# Patient Record
Sex: MALE | Race: BLACK OR AFRICAN AMERICAN | NOT HISPANIC OR LATINO | Employment: OTHER | RURAL
[De-identification: names, ages, dates, MRNs, and addresses within clinical notes are randomized per-mention and may not be internally consistent; named-entity substitution may affect disease eponyms.]

---

## 2018-02-26 ENCOUNTER — HISTORICAL (OUTPATIENT)
Dept: ADMINISTRATIVE | Facility: HOSPITAL | Age: 68
End: 2018-02-26

## 2018-02-26 LAB — CRC RECOMMENDATION EXT: NORMAL

## 2018-02-27 LAB
LAB AP CLINICAL INFORMATION: NORMAL
LAB AP DIAGNOSIS - HISTORICAL: NORMAL
LAB AP GROSS PATHOLOGY - HISTORICAL: NORMAL
LAB AP SPECIMEN SUBMITTED - HISTORICAL: NORMAL

## 2020-06-29 ENCOUNTER — HISTORICAL (OUTPATIENT)
Dept: ADMINISTRATIVE | Facility: HOSPITAL | Age: 70
End: 2020-06-29

## 2021-09-08 ENCOUNTER — OFFICE VISIT (OUTPATIENT)
Dept: FAMILY MEDICINE | Facility: CLINIC | Age: 71
End: 2021-09-08
Payer: MEDICARE

## 2021-09-08 VITALS
WEIGHT: 221 LBS | SYSTOLIC BLOOD PRESSURE: 130 MMHG | BODY MASS INDEX: 30.94 KG/M2 | HEIGHT: 71 IN | RESPIRATION RATE: 20 BRPM | TEMPERATURE: 98 F | DIASTOLIC BLOOD PRESSURE: 70 MMHG | OXYGEN SATURATION: 97 % | HEART RATE: 80 BPM

## 2021-09-08 DIAGNOSIS — M10.9 ACUTE GOUT, UNSPECIFIED CAUSE, UNSPECIFIED SITE: Primary | ICD-10-CM

## 2021-09-08 DIAGNOSIS — R21 RASH AND NONSPECIFIC SKIN ERUPTION: ICD-10-CM

## 2021-09-08 DIAGNOSIS — R60.0 EDEMA OF LEFT LOWER EXTREMITY: ICD-10-CM

## 2021-09-08 PROCEDURE — 99214 PR OFFICE/OUTPT VISIT, EST, LEVL IV, 30-39 MIN: ICD-10-PCS | Mod: 25,,, | Performed by: NURSE PRACTITIONER

## 2021-09-08 PROCEDURE — 96372 THER/PROPH/DIAG INJ SC/IM: CPT | Mod: ,,, | Performed by: NURSE PRACTITIONER

## 2021-09-08 PROCEDURE — 96372 PR INJECTION,THERAP/PROPH/DIAG2ST, IM OR SUBCUT: ICD-10-PCS | Mod: ,,, | Performed by: NURSE PRACTITIONER

## 2021-09-08 PROCEDURE — 99214 OFFICE O/P EST MOD 30 MIN: CPT | Mod: 25,,, | Performed by: NURSE PRACTITIONER

## 2021-09-08 RX ORDER — TRIAMCINOLONE ACETONIDE 40 MG/ML
40 INJECTION, SUSPENSION INTRA-ARTICULAR; INTRAMUSCULAR
Status: COMPLETED | OUTPATIENT
Start: 2021-09-08 | End: 2021-09-08

## 2021-09-08 RX ORDER — TRIAMCINOLONE ACETONIDE 0.25 MG/G
CREAM TOPICAL 2 TIMES DAILY
Qty: 80 G | Refills: 1 | Status: SHIPPED | OUTPATIENT
Start: 2021-09-08 | End: 2022-07-27 | Stop reason: SDUPTHER

## 2021-09-08 RX ORDER — COLCHICINE 0.6 MG/1
1.2 TABLET ORAL DAILY
Qty: 8 TABLET | Refills: 0 | Status: SHIPPED | OUTPATIENT
Start: 2021-09-08 | End: 2022-07-27

## 2021-09-08 RX ADMIN — TRIAMCINOLONE ACETONIDE 40 MG: 40 INJECTION, SUSPENSION INTRA-ARTICULAR; INTRAMUSCULAR at 11:09

## 2021-12-21 ENCOUNTER — OFFICE VISIT (OUTPATIENT)
Dept: FAMILY MEDICINE | Facility: CLINIC | Age: 71
End: 2021-12-21
Payer: MEDICARE

## 2021-12-21 VITALS
OXYGEN SATURATION: 98 % | HEIGHT: 71 IN | RESPIRATION RATE: 16 BRPM | WEIGHT: 223.38 LBS | DIASTOLIC BLOOD PRESSURE: 86 MMHG | BODY MASS INDEX: 31.27 KG/M2 | TEMPERATURE: 97 F | SYSTOLIC BLOOD PRESSURE: 138 MMHG | HEART RATE: 76 BPM

## 2021-12-21 DIAGNOSIS — Z13.1 SCREENING FOR DIABETES MELLITUS (DM): ICD-10-CM

## 2021-12-21 DIAGNOSIS — E78.2 MIXED HYPERLIPIDEMIA: ICD-10-CM

## 2021-12-21 DIAGNOSIS — N52.9 ERECTILE DYSFUNCTION, UNSPECIFIED ERECTILE DYSFUNCTION TYPE: ICD-10-CM

## 2021-12-21 DIAGNOSIS — M54.2 NECK PAIN: Primary | ICD-10-CM

## 2021-12-21 DIAGNOSIS — I49.3 PVC'S (PREMATURE VENTRICULAR CONTRACTIONS): ICD-10-CM

## 2021-12-21 DIAGNOSIS — I10 PRIMARY HYPERTENSION: ICD-10-CM

## 2021-12-21 DIAGNOSIS — Z12.5 SCREENING FOR MALIGNANT NEOPLASM OF PROSTATE: ICD-10-CM

## 2021-12-21 PROCEDURE — 99214 PR OFFICE/OUTPT VISIT, EST, LEVL IV, 30-39 MIN: ICD-10-PCS | Mod: ,,, | Performed by: FAMILY MEDICINE

## 2021-12-21 PROCEDURE — 99214 OFFICE O/P EST MOD 30 MIN: CPT | Mod: ,,, | Performed by: FAMILY MEDICINE

## 2021-12-21 RX ORDER — SILDENAFIL 25 MG/1
TABLET, FILM COATED ORAL
COMMUNITY
Start: 2021-08-18 | End: 2021-12-21 | Stop reason: SDUPTHER

## 2021-12-21 RX ORDER — DOXEPIN HYDROCHLORIDE 25 MG/1
2 CAPSULE ORAL NIGHTLY
COMMUNITY
Start: 2021-08-29 | End: 2022-07-27 | Stop reason: SDUPTHER

## 2021-12-21 RX ORDER — SILDENAFIL 50 MG/1
50 TABLET, FILM COATED ORAL
Qty: 30 TABLET | Refills: 0 | Status: SHIPPED | OUTPATIENT
Start: 2021-12-21 | End: 2022-04-19 | Stop reason: SDUPTHER

## 2021-12-21 RX ORDER — TIZANIDINE 4 MG/1
4 TABLET ORAL EVERY 6 HOURS PRN
Qty: 30 TABLET | Refills: 3 | Status: SHIPPED | OUTPATIENT
Start: 2021-12-21 | End: 2021-12-31

## 2021-12-23 RX ORDER — METOPROLOL SUCCINATE 100 MG/1
100 TABLET, EXTENDED RELEASE ORAL DAILY
Qty: 90 TABLET | Refills: 3 | Status: SHIPPED | OUTPATIENT
Start: 2021-12-23 | End: 2022-07-27 | Stop reason: SDUPTHER

## 2021-12-23 RX ORDER — PRAVASTATIN SODIUM 40 MG/1
40 TABLET ORAL NIGHTLY
Qty: 90 TABLET | Refills: 3 | Status: SHIPPED | OUTPATIENT
Start: 2021-12-23 | End: 2022-07-27 | Stop reason: SDUPTHER

## 2022-01-02 DIAGNOSIS — R73.9 HYPERGLYCEMIA: Primary | ICD-10-CM

## 2022-01-12 DIAGNOSIS — E11.9 TYPE 2 DIABETES MELLITUS WITHOUT COMPLICATION, WITHOUT LONG-TERM CURRENT USE OF INSULIN: Primary | ICD-10-CM

## 2022-01-12 RX ORDER — METFORMIN HYDROCHLORIDE 500 MG/1
500 TABLET ORAL 2 TIMES DAILY WITH MEALS
Qty: 180 TABLET | Refills: 3 | Status: SHIPPED | OUTPATIENT
Start: 2022-01-12 | End: 2022-07-27

## 2022-04-19 DIAGNOSIS — N52.9 ERECTILE DYSFUNCTION, UNSPECIFIED ERECTILE DYSFUNCTION TYPE: ICD-10-CM

## 2022-04-20 RX ORDER — SILDENAFIL 50 MG/1
50 TABLET, FILM COATED ORAL
Qty: 30 TABLET | Refills: 0 | Status: SHIPPED | OUTPATIENT
Start: 2022-04-20 | End: 2022-12-21 | Stop reason: SDUPTHER

## 2022-06-29 LAB
LEFT EYE DM RETINOPATHY: NEGATIVE
RIGHT EYE DM RETINOPATHY: NEGATIVE

## 2022-07-27 ENCOUNTER — OFFICE VISIT (OUTPATIENT)
Dept: FAMILY MEDICINE | Facility: CLINIC | Age: 72
End: 2022-07-27
Payer: MEDICARE

## 2022-07-27 VITALS
HEART RATE: 85 BPM | OXYGEN SATURATION: 98 % | TEMPERATURE: 98 F | DIASTOLIC BLOOD PRESSURE: 60 MMHG | WEIGHT: 203 LBS | SYSTOLIC BLOOD PRESSURE: 110 MMHG | BODY MASS INDEX: 28.42 KG/M2 | HEIGHT: 71 IN

## 2022-07-27 DIAGNOSIS — M10.9 GOUT, UNSPECIFIED CAUSE, UNSPECIFIED CHRONICITY, UNSPECIFIED SITE: ICD-10-CM

## 2022-07-27 DIAGNOSIS — I10 HYPERTENSION, UNSPECIFIED TYPE: ICD-10-CM

## 2022-07-27 DIAGNOSIS — R21 RASH AND NONSPECIFIC SKIN ERUPTION: ICD-10-CM

## 2022-07-27 DIAGNOSIS — E78.5 HYPERLIPIDEMIA, UNSPECIFIED HYPERLIPIDEMIA TYPE: ICD-10-CM

## 2022-07-27 PROCEDURE — 99213 PR OFFICE/OUTPT VISIT, EST, LEVL III, 20-29 MIN: ICD-10-PCS | Mod: ,,, | Performed by: NURSE PRACTITIONER

## 2022-07-27 PROCEDURE — 99213 OFFICE O/P EST LOW 20 MIN: CPT | Mod: ,,, | Performed by: NURSE PRACTITIONER

## 2022-07-27 PROCEDURE — 96372 THER/PROPH/DIAG INJ SC/IM: CPT | Mod: ,,, | Performed by: NURSE PRACTITIONER

## 2022-07-27 PROCEDURE — 96372 PR INJECTION,THERAP/PROPH/DIAG2ST, IM OR SUBCUT: ICD-10-PCS | Mod: ,,, | Performed by: NURSE PRACTITIONER

## 2022-07-27 RX ORDER — COLCHICINE 0.6 MG/1
0.6 TABLET ORAL DAILY PRN
Qty: 90 TABLET | Refills: 1 | Status: SHIPPED | OUTPATIENT
Start: 2022-07-27 | End: 2022-12-21 | Stop reason: SDUPTHER

## 2022-07-27 RX ORDER — PRAVASTATIN SODIUM 40 MG/1
40 TABLET ORAL NIGHTLY
Qty: 90 TABLET | Refills: 3 | Status: SHIPPED | OUTPATIENT
Start: 2022-07-27 | End: 2022-12-21 | Stop reason: SDUPTHER

## 2022-07-27 RX ORDER — TRIAMCINOLONE ACETONIDE 0.25 MG/G
CREAM TOPICAL 2 TIMES DAILY
Qty: 80 G | Refills: 1 | Status: SHIPPED | OUTPATIENT
Start: 2022-07-27 | End: 2022-12-21 | Stop reason: SDUPTHER

## 2022-07-27 RX ORDER — DOXEPIN HYDROCHLORIDE 25 MG/1
50 CAPSULE ORAL NIGHTLY
Qty: 30 CAPSULE | Refills: 3 | Status: SHIPPED | OUTPATIENT
Start: 2022-07-27

## 2022-07-27 RX ORDER — COLCHICINE 0.6 MG/1
0.6 TABLET ORAL DAILY PRN
Qty: 20 TABLET | Refills: 3 | Status: SHIPPED | OUTPATIENT
Start: 2022-07-27 | End: 2022-07-27 | Stop reason: SDUPTHER

## 2022-07-27 RX ORDER — METHYLPREDNISOLONE ACETATE 40 MG/ML
40 INJECTION, SUSPENSION INTRA-ARTICULAR; INTRALESIONAL; INTRAMUSCULAR; SOFT TISSUE
Status: COMPLETED | OUTPATIENT
Start: 2022-07-27 | End: 2022-07-27

## 2022-07-27 RX ORDER — METOPROLOL SUCCINATE 100 MG/1
100 TABLET, EXTENDED RELEASE ORAL DAILY
Qty: 90 TABLET | Refills: 3 | Status: SHIPPED | OUTPATIENT
Start: 2022-07-27 | End: 2022-12-21 | Stop reason: SDUPTHER

## 2022-07-27 RX ADMIN — METHYLPREDNISOLONE ACETATE 40 MG: 40 INJECTION, SUSPENSION INTRA-ARTICULAR; INTRALESIONAL; INTRAMUSCULAR; SOFT TISSUE at 01:07

## 2022-07-27 NOTE — PROGRESS NOTES
Corinne Lyles NP   [facility]  [address]  [place]  [address]      PATIENT NAME: Noemi Buckner  : 1950  DATE: 22  MRN: [id number]      Billing Provider: Corinne Lyles NP  Level of Service:   Patient PCP Information     Provider PCP Type    Corinne Lyles NP General          Reason for Visit / Chief Complaint: Medication Refill (Gout medicine )       Update PCP  Update Chief Complaint         History of Present Illness / Problem Focused Workflow     Noemi Buckner presents to the clinic with Medication Refill (Gout medicine )     72 year old male presents to clinic with requests for refills. He also states he is having a gout flare to left foot.  Dorsal aspect of left foot is swollen, red, and tender.  He has PMH of HTN, Hyperlipidemia, Gout, and Arthritis.         Review of Systems     @Review of Systems   Constitutional: Negative for activity change and unexpected weight change.   HENT: Negative for hearing loss, rhinorrhea and trouble swallowing.    Eyes: Negative for discharge and visual disturbance.   Respiratory: Negative for chest tightness and wheezing.    Cardiovascular: Negative for chest pain and palpitations.   Gastrointestinal: Negative for blood in stool, constipation, diarrhea and vomiting.   Endocrine: Negative for polydipsia and polyuria.   Genitourinary: Negative for difficulty urinating, hematuria and urgency.   Musculoskeletal: Positive for arthralgias. Negative for joint swelling and neck pain.   Neurological: Negative for weakness and headaches.   Psychiatric/Behavioral: Negative for confusion and dysphoric mood.       Medical / Social / Family History     Past Medical History:   Diagnosis Date    Arthritis     Gout a    Hyperlipidemia     Hypertension        Past Surgical History:   Procedure Laterality Date    CIRCUMCISION         Social History    reports that he has never smoked. He has never used smokeless tobacco. He reports that he  does not drink alcohol and does not use drugs.    Family History  's family history is not on file.    Medications and Allergies     Medications  Outpatient Medications Marked as Taking for the 7/27/22 encounter (Office Visit) with Corinne Lyles NP   Medication Sig Dispense Refill    sildenafiL (VIAGRA) 50 MG tablet Take 1 tablet (50 mg total) by mouth as needed for Erectile Dysfunction. 30 tablet 0    [DISCONTINUED] COLCRYS 0.6 mg tablet TAKE 1 TABLET DAILY AS NEEDED 90 tablet 3    [DISCONTINUED] doxepin (SINEQUAN) 25 MG capsule Take 2 capsules by mouth nightly.      [DISCONTINUED] metoprolol succinate (TOPROL-XL) 100 MG 24 hr tablet Take 1 tablet (100 mg total) by mouth once daily. 90 tablet 3    [DISCONTINUED] pravastatin (PRAVACHOL) 40 MG tablet Take 1 tablet (40 mg total) by mouth nightly. 90 tablet 3    [DISCONTINUED] triamcinolone acetonide 0.025% (KENALOG) 0.025 % cream Apply topically 2 (two) times daily. 80 g 1       Allergies  Review of patient's allergies indicates:   Allergen Reactions    Bactrim [sulfamethoxazole-trimethoprim]        Physical Examination     Vitals:    07/27/22 1308   BP: 110/60   Pulse: 85   Temp: 97.7 °F (36.5 °C)     Physical Exam  Vitals and nursing note reviewed.   Constitutional:       Appearance: He is obese.   HENT:      Head: Normocephalic.      Right Ear: Tympanic membrane normal.      Left Ear: Tympanic membrane normal.      Nose: Nose normal.      Mouth/Throat:      Mouth: Mucous membranes are moist.      Pharynx: Oropharynx is clear. No posterior oropharyngeal erythema.   Eyes:      Conjunctiva/sclera: Conjunctivae normal.   Cardiovascular:      Rate and Rhythm: Normal rate and regular rhythm.      Heart sounds: Normal heart sounds.   Pulmonary:      Effort: Pulmonary effort is normal.      Breath sounds: Normal breath sounds.   Abdominal:      General: Abdomen is flat. Bowel sounds are normal. There is no distension.      Palpations: Abdomen is soft.    Musculoskeletal:         General: No swelling or tenderness.      Cervical back: Normal range of motion.      Right lower leg: No edema.      Left lower leg: No edema.      Left foot: Swelling and bony tenderness present.      Comments: Redness, swelling, and tenderness to dorsal aspect of left foot.    Skin:     General: Skin is warm and dry.      Capillary Refill: Capillary refill takes less than 2 seconds.   Neurological:      Mental Status: He is alert. Mental status is at baseline.   Psychiatric:         Mood and Affect: Mood normal.         Behavior: Behavior normal.               Lab Results   Component Value Date    WBC 5.59 12/23/2021    HGB 13.6 12/23/2021    HCT 44.0 12/23/2021    MCV 87.3 12/23/2021     12/23/2021          Sodium   Date Value Ref Range Status   12/23/2021 142 136 - 145 mmol/L Final     Potassium   Date Value Ref Range Status   12/23/2021 4.0 3.5 - 5.1 mmol/L Final     Chloride   Date Value Ref Range Status   12/23/2021 107 98 - 107 mmol/L Final     CO2   Date Value Ref Range Status   12/23/2021 30 21 - 32 mmol/L Final     Glucose   Date Value Ref Range Status   12/23/2021 128 (H) 74 - 106 mg/dL Final     BUN   Date Value Ref Range Status   12/23/2021 14 7 - 18 mg/dL Final     Creatinine   Date Value Ref Range Status   12/23/2021 1.50 (H) 0.70 - 1.30 mg/dL Final     Calcium   Date Value Ref Range Status   12/23/2021 8.7 8.5 - 10.1 mg/dL Final     Total Protein   Date Value Ref Range Status   12/23/2021 7.7 6.4 - 8.2 g/dL Final     Albumin   Date Value Ref Range Status   12/23/2021 3.2 (L) 3.5 - 5.0 g/dL Final     Bilirubin, Total   Date Value Ref Range Status   12/23/2021 0.3 0.0 - 1.2 mg/dL Final     Alk Phos   Date Value Ref Range Status   12/23/2021 81 45 - 115 U/L Final     AST   Date Value Ref Range Status   12/23/2021 15 15 - 37 U/L Final     ALT   Date Value Ref Range Status   12/23/2021 25 16 - 61 U/L Final     Anion Gap   Date Value Ref Range Status   12/23/2021 9 7 - 16  mmol/L Final     eGFR   Date Value Ref Range Status   12/23/2021 49 (L) >=60 mL/min/1.73m² Final      X-Ray Hand 3 view Right  Narrative: Place of service: [facility]    PROCEDURE: XR HAND RIGHT COMPLETE    HISTORY:   Right hand pain     COMPARISON: None.    FINDINGS:   Mineralization is normal. There is osteoarthritis at the radial-carpal  joint space, the first carpal-metacarpal joint space, the  navicular-trapezium articulation, involving the first MCP joint, and  involving the DIP joints of all of the fingers. Severe osteoarthritis  present involving the PIP joint of the middle finger. No subluxation or  fracture is seen.  Impression: IMPRESSION:   Osteoarthritis is present involving multiple sites.    This report has been electronically signed by Jennifer Barton     Procedures   Assessment and Plan (including Health Maintenance)      Problem List  Smart Sets  Document Outside HM   :    Plan:           Problem List Items Addressed This Visit    None     Visit Diagnoses     Rash and nonspecific skin eruption        Relevant Medications    triamcinolone acetonide 0.025% (KENALOG) 0.025 % cream          Health Maintenance Topics with due status: Not Due       Topic Last Completion Date    Influenza Vaccine 11/20/2021    Lipid Panel 12/23/2021       No future appointments.     Health Maintenance Due   Topic Date Due    Hepatitis C Screening  Never done    COVID-19 Vaccine (1) Never done    TETANUS VACCINE  Never done    Colorectal Cancer Screening  Never done    Shingles Vaccine (1 of 2) Never done    Pneumococcal Vaccines (Age 65+) (1 - PCV) Never done        No follow-ups on file.     Signature:  Corinne Lyles NP  [facility]  [address] [place]  [address]    Date of encounter: 7/27/22

## 2022-07-28 PROBLEM — R21 RASH AND NONSPECIFIC SKIN ERUPTION: Status: ACTIVE | Noted: 2022-07-28

## 2022-07-28 NOTE — ASSESSMENT & PLAN NOTE
Colchicine as ordered.   Reviewed pathology of current symptoms, medication side effects/risk/benefits/directions on taking medications, and discussed worsening symptoms to return to clinic or if after hours to go to ER. Reviewed preventative management as well. Instructed patient to increase water intake, adhere to a low purine diet, and cut back on red meats, shellfish, and alcohol. All medication benefits and risks discussed with patient. Instructed the patient to return if symptoms persist or worsen.

## 2022-07-28 NOTE — ASSESSMENT & PLAN NOTE
Continue current medication. LDL goal is less than 70.  Lipid panel ordered, patient has eaten prior to arrival. Instructed patient to return to clinic to have labwork done when fasting.

## 2022-07-28 NOTE — ASSESSMENT & PLAN NOTE
Continue current medications  Lfestyle changes to help lower blood pressure reviewed, DASH eating plan encouraged, Weight loss of 0.5-1 lb/week suggested, stressed the importance of routine exercise 30 minutes per day for 3-5 days/week, sodium restriction, and alcohol in moderation less than 2 drinks a day. Patient was encouraged to check blood pressure daily (at home and work), blood pressure log provided, bring BP log to next appointment, and to monitor for discussed worsening symptoms that require emergent medical attention.

## 2022-12-14 ENCOUNTER — OFFICE VISIT (OUTPATIENT)
Dept: GASTROENTEROLOGY | Facility: CLINIC | Age: 72
End: 2022-12-14
Payer: MEDICARE

## 2022-12-14 VITALS
HEIGHT: 71 IN | BODY MASS INDEX: 29.79 KG/M2 | HEART RATE: 66 BPM | DIASTOLIC BLOOD PRESSURE: 71 MMHG | WEIGHT: 212.81 LBS | SYSTOLIC BLOOD PRESSURE: 130 MMHG | OXYGEN SATURATION: 97 %

## 2022-12-14 DIAGNOSIS — Z86.010 PERSONAL HISTORY OF COLONIC POLYPS: ICD-10-CM

## 2022-12-14 PROBLEM — Z86.0100 PERSONAL HISTORY OF COLONIC POLYPS: Status: ACTIVE | Noted: 2022-12-14

## 2022-12-14 PROCEDURE — 99499 NO LOS: ICD-10-PCS | Mod: ,,, | Performed by: NURSE PRACTITIONER

## 2022-12-14 PROCEDURE — 99499 UNLISTED E&M SERVICE: CPT | Mod: ,,, | Performed by: NURSE PRACTITIONER

## 2022-12-14 RX ORDER — HYDROCODONE BITARTRATE AND ACETAMINOPHEN 5; 325 MG/1; MG/1
1 TABLET ORAL
COMMUNITY
Start: 2022-12-09 | End: 2023-07-14

## 2022-12-14 RX ORDER — AMLODIPINE BESYLATE 2.5 MG/1
2.5 TABLET ORAL
COMMUNITY
Start: 2022-11-30 | End: 2022-12-21 | Stop reason: SDUPTHER

## 2022-12-14 RX ORDER — CLINDAMYCIN HYDROCHLORIDE 300 MG/1
300 CAPSULE ORAL 3 TIMES DAILY
COMMUNITY
Start: 2022-12-09 | End: 2023-05-04

## 2022-12-14 RX ORDER — TIZANIDINE 4 MG/1
4 TABLET ORAL EVERY 6 HOURS PRN
COMMUNITY
Start: 2022-12-11 | End: 2022-12-21 | Stop reason: SDUPTHER

## 2022-12-14 NOTE — PROGRESS NOTES
Noemi Buckner is a 72 y.o. male here for Weight Loss        PCP: Corinne Lyles  Referring Provider: No referring provider defined for this encounter.     HPI:  Presents to discuss scheduling colonoscopy. Last colonoscopy 2/26/18 reviewed, TA x 3 noted. Colonoscopy per Dr. Alvarenga. Reports that his weight is increased and that his appetite is good. No dysphagia or abdominal pain. He is due to get lab at Dr. Yang office next Wed.         ROS:  Review of Systems   Constitutional:  Negative for activity change, fatigue, fever and unexpected weight change (improved).   HENT:  Negative for trouble swallowing.    Respiratory:  Negative for cough.    Cardiovascular:  Negative for chest pain.   Gastrointestinal:  Negative for abdominal distention, abdominal pain, blood in stool, change in bowel habit, constipation, diarrhea, nausea, vomiting, reflux and change in bowel habit.   Musculoskeletal:  Negative for gait problem.   Integumentary:  Negative for color change.   Neurological:  Negative for dizziness.   Hematological:  Does not bruise/bleed easily.   Psychiatric/Behavioral:  Negative for sleep disturbance. The patient is not nervous/anxious.         PMHX:  has a past medical history of Arthritis, Gout (a), Hyperlipidemia, and Hypertension.    PSHX:  has a past surgical history that includes Circumcision.    PFHX: family history is not on file.    PSlHX:  reports that he has never smoked. He has never used smokeless tobacco. He reports that he does not drink alcohol and does not use drugs.        Review of patient's allergies indicates:   Allergen Reactions    Bactrim [sulfamethoxazole-trimethoprim]        Medication List with Changes/Refills   Current Medications    AMLODIPINE (NORVASC) 2.5 MG TABLET    Take 2.5 mg by mouth.    CLINDAMYCIN (CLEOCIN) 300 MG CAPSULE    Take 300 mg by mouth 3 (three) times daily.    COLCHICINE (COLCRYS) 0.6 MG TABLET    Take 1 tablet (0.6 mg total) by mouth daily as needed (gout  "flare).    DOXEPIN (SINEQUAN) 25 MG CAPSULE    Take 2 capsules (50 mg total) by mouth nightly.    HYDROCODONE-ACETAMINOPHEN (NORCO) 5-325 MG PER TABLET    Take 1 tablet by mouth.    METOPROLOL SUCCINATE (TOPROL-XL) 100 MG 24 HR TABLET    Take 1 tablet (100 mg total) by mouth once daily.    PRAVASTATIN (PRAVACHOL) 40 MG TABLET    Take 1 tablet (40 mg total) by mouth nightly.    SILDENAFIL (VIAGRA) 50 MG TABLET    Take 1 tablet (50 mg total) by mouth as needed for Erectile Dysfunction.    TIZANIDINE (ZANAFLEX) 4 MG TABLET    Take 4 mg by mouth every 6 (six) hours as needed.    TRIAMCINOLONE ACETONIDE 0.025% (KENALOG) 0.025 % CREAM    Apply topically 2 (two) times daily.        Objective Findings:  Vital Signs:  /71   Pulse 66   Ht 5' 11" (1.803 m)   Wt 96.5 kg (212 lb 12.8 oz)   SpO2 97%   BMI 29.68 kg/m²  Body mass index is 29.68 kg/m².    Physical Exam:  Physical Exam  Vitals and nursing note reviewed.   Constitutional:       General: He is not in acute distress.     Appearance: Normal appearance.   HENT:      Mouth/Throat:      Mouth: Mucous membranes are moist.   Cardiovascular:      Rate and Rhythm: Normal rate.   Pulmonary:      Effort: Pulmonary effort is normal.      Breath sounds: No wheezing, rhonchi or rales.   Abdominal:      General: Bowel sounds are normal. There is no distension.      Palpations: Abdomen is soft. There is no mass.      Tenderness: There is no abdominal tenderness. There is no guarding or rebound.      Hernia: No hernia is present.   Skin:     General: Skin is warm and dry.      Coloration: Skin is not jaundiced or pale.   Neurological:      Mental Status: He is alert and oriented to person, place, and time.   Psychiatric:         Mood and Affect: Mood normal.        Labs:  Lab Results   Component Value Date    WBC 5.59 12/23/2021    HGB 13.6 12/23/2021    HCT 44.0 12/23/2021    MCV 87.3 12/23/2021    RDW 16.5 (H) 12/23/2021     12/23/2021    LYMPH 50.4 (H) 12/23/2021 "    LYMPH 2.82 12/23/2021    MONO 12.0 (H) 12/23/2021    EOS 0.17 12/23/2021    BASO 0.08 12/23/2021     Lab Results   Component Value Date     12/23/2021    K 4.0 12/23/2021     12/23/2021    CO2 30 12/23/2021     (H) 12/23/2021    BUN 14 12/23/2021    CREATININE 1.50 (H) 12/23/2021    CALCIUM 8.7 12/23/2021    PROT 7.7 12/23/2021    ALBUMIN 3.2 (L) 12/23/2021    BILITOT 0.3 12/23/2021    ALKPHOS 81 12/23/2021    AST 15 12/23/2021    ALT 25 12/23/2021         Imaging: No results found.      Assessment:  Noemi Buckner is a 72 y.o. male here with:  1. Personal history of colonic polyps          Recommendations:  1. Schedule colonoscopy TA x 3 2018      No follow-ups on file.      Order summary:  Orders Placed This Encounter    Colonoscopy       Thank you for allowing me to participate in the care of Noemi Buckner.      KIANA Mosley

## 2022-12-21 ENCOUNTER — OFFICE VISIT (OUTPATIENT)
Dept: FAMILY MEDICINE | Facility: CLINIC | Age: 72
End: 2022-12-21
Payer: MEDICARE

## 2022-12-21 VITALS
OXYGEN SATURATION: 98 % | BODY MASS INDEX: 28.98 KG/M2 | SYSTOLIC BLOOD PRESSURE: 120 MMHG | RESPIRATION RATE: 18 BRPM | WEIGHT: 207 LBS | DIASTOLIC BLOOD PRESSURE: 76 MMHG | HEIGHT: 71 IN | TEMPERATURE: 98 F | HEART RATE: 94 BPM

## 2022-12-21 DIAGNOSIS — M62.838 MUSCLE SPASM: ICD-10-CM

## 2022-12-21 DIAGNOSIS — R21 RASH AND NONSPECIFIC SKIN ERUPTION: ICD-10-CM

## 2022-12-21 DIAGNOSIS — E11.9 TYPE 2 DIABETES MELLITUS WITHOUT COMPLICATION, WITHOUT LONG-TERM CURRENT USE OF INSULIN: ICD-10-CM

## 2022-12-21 DIAGNOSIS — E78.5 HYPERLIPIDEMIA, UNSPECIFIED HYPERLIPIDEMIA TYPE: ICD-10-CM

## 2022-12-21 DIAGNOSIS — L84 CALLUS OF FOOT: ICD-10-CM

## 2022-12-21 DIAGNOSIS — N52.9 ERECTILE DYSFUNCTION, UNSPECIFIED ERECTILE DYSFUNCTION TYPE: ICD-10-CM

## 2022-12-21 DIAGNOSIS — R60.0 LOWER EXTREMITY EDEMA: ICD-10-CM

## 2022-12-21 DIAGNOSIS — M10.9 GOUT, UNSPECIFIED CAUSE, UNSPECIFIED CHRONICITY, UNSPECIFIED SITE: ICD-10-CM

## 2022-12-21 DIAGNOSIS — I10 HYPERTENSION, UNSPECIFIED TYPE: Primary | ICD-10-CM

## 2022-12-21 LAB
ALBUMIN SERPL BCP-MCNC: 3.3 G/DL (ref 3.5–5)
ALBUMIN/GLOB SERPL: 0.8 {RATIO}
ALP SERPL-CCNC: 73 U/L (ref 45–115)
ALT SERPL W P-5'-P-CCNC: 24 U/L (ref 16–61)
ANION GAP SERPL CALCULATED.3IONS-SCNC: 12 MMOL/L (ref 7–16)
AST SERPL W P-5'-P-CCNC: 17 U/L (ref 15–37)
BASOPHILS # BLD AUTO: 0.06 K/UL (ref 0–0.2)
BASOPHILS NFR BLD AUTO: 1.2 % (ref 0–1)
BILIRUB SERPL-MCNC: 0.2 MG/DL (ref ?–1.2)
BUN SERPL-MCNC: 18 MG/DL (ref 7–18)
BUN/CREAT SERPL: 15 (ref 6–20)
CALCIUM SERPL-MCNC: 8.9 MG/DL (ref 8.5–10.1)
CHLORIDE SERPL-SCNC: 110 MMOL/L (ref 98–107)
CHOLEST SERPL-MCNC: 149 MG/DL (ref 0–200)
CHOLEST/HDLC SERPL: 3.2 {RATIO}
CO2 SERPL-SCNC: 26 MMOL/L (ref 21–32)
CREAT SERPL-MCNC: 1.23 MG/DL (ref 0.7–1.3)
DIFFERENTIAL METHOD BLD: ABNORMAL
EGFR (NO RACE VARIABLE) (RUSH/TITUS): 62 ML/MIN/1.73M²
EOSINOPHIL # BLD AUTO: 0.18 K/UL (ref 0–0.5)
EOSINOPHIL NFR BLD AUTO: 3.5 % (ref 1–4)
ERYTHROCYTE [DISTWIDTH] IN BLOOD BY AUTOMATED COUNT: 15.9 % (ref 11.5–14.5)
EST. AVERAGE GLUCOSE BLD GHB EST-MCNC: 120 MG/DL
GLOBULIN SER-MCNC: 4 G/DL (ref 2–4)
GLUCOSE SERPL-MCNC: 100 MG/DL (ref 74–106)
HBA1C MFR BLD HPLC: 6.2 % (ref 4.5–6.6)
HCT VFR BLD AUTO: 41.9 % (ref 40–54)
HDLC SERPL-MCNC: 46 MG/DL (ref 40–60)
HGB BLD-MCNC: 12.9 G/DL (ref 13.5–18)
IMM GRANULOCYTES # BLD AUTO: 0.01 K/UL (ref 0–0.04)
IMM GRANULOCYTES NFR BLD: 0.2 % (ref 0–0.4)
LDLC SERPL CALC-MCNC: 88 MG/DL
LDLC/HDLC SERPL: 1.9 {RATIO}
LYMPHOCYTES # BLD AUTO: 2.24 K/UL (ref 1–4.8)
LYMPHOCYTES NFR BLD AUTO: 43.8 % (ref 27–41)
MCH RBC QN AUTO: 26.1 PG (ref 27–31)
MCHC RBC AUTO-ENTMCNC: 30.8 G/DL (ref 32–36)
MCV RBC AUTO: 84.8 FL (ref 80–96)
MONOCYTES # BLD AUTO: 0.69 K/UL (ref 0–0.8)
MONOCYTES NFR BLD AUTO: 13.5 % (ref 2–6)
MPC BLD CALC-MCNC: 10.3 FL (ref 9.4–12.4)
NEUTROPHILS # BLD AUTO: 1.93 K/UL (ref 1.8–7.7)
NEUTROPHILS NFR BLD AUTO: 37.8 % (ref 53–65)
NONHDLC SERPL-MCNC: 103 MG/DL
NRBC # BLD AUTO: 0 X10E3/UL
NRBC, AUTO (.00): 0 %
PLATELET # BLD AUTO: 258 K/UL (ref 150–400)
POTASSIUM SERPL-SCNC: 4.2 MMOL/L (ref 3.5–5.1)
PROT SERPL-MCNC: 7.3 G/DL (ref 6.4–8.2)
RBC # BLD AUTO: 4.94 M/UL (ref 4.6–6.2)
SODIUM SERPL-SCNC: 144 MMOL/L (ref 136–145)
TRIGL SERPL-MCNC: 76 MG/DL (ref 35–150)
VLDLC SERPL-MCNC: 15 MG/DL
WBC # BLD AUTO: 5.11 K/UL (ref 4.5–11)

## 2022-12-21 PROCEDURE — 85025 CBC WITH DIFFERENTIAL: ICD-10-PCS | Mod: ,,, | Performed by: CLINICAL MEDICAL LABORATORY

## 2022-12-21 PROCEDURE — 83036 HEMOGLOBIN GLYCOSYLATED A1C: CPT | Mod: ,,, | Performed by: CLINICAL MEDICAL LABORATORY

## 2022-12-21 PROCEDURE — 80053 COMPREHENSIVE METABOLIC PANEL: ICD-10-PCS | Mod: ,,, | Performed by: CLINICAL MEDICAL LABORATORY

## 2022-12-21 PROCEDURE — 83036 HEMOGLOBIN A1C: ICD-10-PCS | Mod: ,,, | Performed by: CLINICAL MEDICAL LABORATORY

## 2022-12-21 PROCEDURE — 80061 LIPID PANEL: ICD-10-PCS | Mod: ,,, | Performed by: CLINICAL MEDICAL LABORATORY

## 2022-12-21 PROCEDURE — 85025 COMPLETE CBC W/AUTO DIFF WBC: CPT | Mod: ,,, | Performed by: CLINICAL MEDICAL LABORATORY

## 2022-12-21 PROCEDURE — 99213 PR OFFICE/OUTPT VISIT, EST, LEVL III, 20-29 MIN: ICD-10-PCS | Mod: ,,, | Performed by: NURSE PRACTITIONER

## 2022-12-21 PROCEDURE — 80061 LIPID PANEL: CPT | Mod: ,,, | Performed by: CLINICAL MEDICAL LABORATORY

## 2022-12-21 PROCEDURE — 99213 OFFICE O/P EST LOW 20 MIN: CPT | Mod: ,,, | Performed by: NURSE PRACTITIONER

## 2022-12-21 PROCEDURE — 80053 COMPREHEN METABOLIC PANEL: CPT | Mod: ,,, | Performed by: CLINICAL MEDICAL LABORATORY

## 2022-12-21 RX ORDER — TRIAMCINOLONE ACETONIDE 0.25 MG/G
CREAM TOPICAL 2 TIMES DAILY
Qty: 80 G | Refills: 1 | Status: SHIPPED | OUTPATIENT
Start: 2022-12-21

## 2022-12-21 RX ORDER — METOPROLOL SUCCINATE 100 MG/1
100 TABLET, EXTENDED RELEASE ORAL DAILY
Qty: 90 TABLET | Refills: 1 | Status: SHIPPED | OUTPATIENT
Start: 2022-12-21

## 2022-12-21 RX ORDER — TIZANIDINE 4 MG/1
4 TABLET ORAL EVERY 6 HOURS PRN
Qty: 90 TABLET | Refills: 1 | Status: SHIPPED | OUTPATIENT
Start: 2022-12-21 | End: 2023-06-05

## 2022-12-21 RX ORDER — PREDNISONE 20 MG/1
1 TABLET ORAL DAILY PRN
COMMUNITY
Start: 2021-06-01 | End: 2022-12-21 | Stop reason: SDUPTHER

## 2022-12-21 RX ORDER — PREDNISONE 20 MG/1
20 TABLET ORAL DAILY
Qty: 90 TABLET | Refills: 1 | Status: SHIPPED | OUTPATIENT
Start: 2022-12-21 | End: 2023-05-04

## 2022-12-21 RX ORDER — PRAVASTATIN SODIUM 40 MG/1
40 TABLET ORAL NIGHTLY
Qty: 90 TABLET | Refills: 1 | Status: SHIPPED | OUTPATIENT
Start: 2022-12-21

## 2022-12-21 RX ORDER — SILDENAFIL 50 MG/1
50 TABLET, FILM COATED ORAL
Qty: 30 TABLET | Refills: 0 | Status: SHIPPED | OUTPATIENT
Start: 2022-12-21 | End: 2023-07-14 | Stop reason: SDUPTHER

## 2022-12-21 RX ORDER — COLCHICINE 0.6 MG/1
0.6 TABLET ORAL DAILY PRN
Qty: 90 TABLET | Refills: 1 | Status: SHIPPED | OUTPATIENT
Start: 2022-12-21 | End: 2023-01-31

## 2022-12-21 RX ORDER — AMLODIPINE BESYLATE 2.5 MG/1
2.5 TABLET ORAL DAILY
Qty: 90 TABLET | Refills: 1 | Status: SHIPPED | OUTPATIENT
Start: 2022-12-21

## 2022-12-21 NOTE — PROGRESS NOTES
"   ARON Caldera   [facility]  [id number] [place] [id number]  [phone]      PATIENT NAME: Noemi Buckner  : 1950  DATE: 22  MRN: [id number]      Billing Provider: ARON Caldera  Level of Service:   Patient PCP Information       Provider PCP Type    ARON Delcid General            Reason for Visit / Chief Complaint: Medication Refill (Needs new rxs sent to [facility] r/t insurance change per pharmacy 23./Is fasting for lab.) and Skin Problem (Discolored area (darker) to Left great toe./Denies itching, burning, drainage./States he noticed it this month.)       Update PCP  Update Chief Complaint         History of Present Illness / Problem Focused Workflow     72 year old male presents for medication refills  Complaints of discoloration of left great toe  Wife requests "circulation check" of lower ext  She does not want PSA checked with labs; wife states he has a urologist already  Wife also request A1C to be done; states she wants to "keep check on his sugar"  States she would like his prescriptions be printed due to having to wait to send to insurance    Hx of hypertension, hyperlipidemia, ED, arthritis, DM      Review of Systems     Review of Systems   Constitutional:  Negative for activity change and unexpected weight change.   HENT:  Negative for hearing loss, rhinorrhea and trouble swallowing.    Eyes:  Negative for discharge and visual disturbance.   Respiratory:  Negative for chest tightness and wheezing.    Cardiovascular:  Positive for leg swelling. Negative for chest pain and palpitations.   Gastrointestinal:  Negative for blood in stool, constipation, diarrhea and vomiting.   Endocrine: Negative for polydipsia and polyuria.   Genitourinary:  Negative for difficulty urinating, hematuria and urgency.   Musculoskeletal:  Positive for arthralgias. Negative for gait problem, joint swelling and neck pain.   Skin:  " Positive for rash (occasional face rash).        Old callus to left great toe; no open area   Neurological:  Negative for weakness and headaches.   Psychiatric/Behavioral:  Negative for confusion and dysphoric mood.      Medical / Social / Family History     Past Medical History:   Diagnosis Date    Arthritis     Gout a    Hyperlipidemia     Hypertension        Past Surgical History:   Procedure Laterality Date    CIRCUMCISION         Social History    reports that he has never smoked. He has never used smokeless tobacco. He reports that he does not drink alcohol and does not use drugs.    Family History  's family history is not on file.    Medications and Allergies     Medications  Outpatient Medications Marked as Taking for the 12/21/22 encounter (Office Visit) with ARON Caldera   Medication Sig Dispense Refill    doxepin (SINEQUAN) 25 MG capsule Take 2 capsules (50 mg total) by mouth nightly. 30 capsule 3    HYDROcodone-acetaminophen (NORCO) 5-325 mg per tablet Take 1 tablet by mouth.      [DISCONTINUED] amLODIPine (NORVASC) 2.5 MG tablet Take 2.5 mg by mouth.      [DISCONTINUED] colchicine (COLCRYS) 0.6 mg tablet Take 1 tablet (0.6 mg total) by mouth daily as needed (gout flare). 90 tablet 1    [DISCONTINUED] metoprolol succinate (TOPROL-XL) 100 MG 24 hr tablet Take 1 tablet (100 mg total) by mouth once daily. 90 tablet 3    [DISCONTINUED] pravastatin (PRAVACHOL) 40 MG tablet Take 1 tablet (40 mg total) by mouth nightly. 90 tablet 3    [DISCONTINUED] predniSONE (DELTASONE) 20 MG tablet Take 1 tablet by mouth daily as needed.      [DISCONTINUED] sildenafiL (VIAGRA) 50 MG tablet Take 1 tablet (50 mg total) by mouth as needed for Erectile Dysfunction. 30 tablet 0    [DISCONTINUED] tiZANidine (ZANAFLEX) 4 MG tablet Take 4 mg by mouth every 6 (six) hours as needed.      [DISCONTINUED] triamcinolone acetonide 0.025% (KENALOG) 0.025 % cream Apply topically 2 (two) times daily. 80 g 1        Allergies  Review of patient's allergies indicates:   Allergen Reactions    Bactrim [sulfamethoxazole-trimethoprim]        Physical Examination     Vitals:    12/21/22 0857   BP: 120/76   Pulse: 94   Resp: 18   Temp: 97.5 °F (36.4 °C)     Physical Exam  Constitutional:       General: He is not in acute distress.  HENT:      Head: Normocephalic.      Nose: Nose normal.      Mouth/Throat:      Mouth: Mucous membranes are moist.   Eyes:      Extraocular Movements: Extraocular movements intact.   Cardiovascular:      Rate and Rhythm: Normal rate.   Pulmonary:      Effort: Pulmonary effort is normal. No respiratory distress.   Abdominal:      General: Bowel sounds are normal.      Palpations: Abdomen is soft.   Musculoskeletal:         General: Normal range of motion.      Cervical back: Neck supple.      Right lower leg: Edema present.      Left lower leg: Edema present.   Skin:     General: Skin is warm.      Comments: Old callus to lateral great toe area; no open area   Neurological:      Mental Status: He is alert and oriented to person, place, and time.   Psychiatric:         Behavior: Behavior normal.         Imaging / Labs     Office Visit on 12/21/2022   Component Date Value Ref Range Status    Triglycerides 12/21/2022 76  35 - 150 mg/dL Final    Cholesterol 12/21/2022 149  0 - 200 mg/dL Final    HDL Cholesterol 12/21/2022 46  40 - 60 mg/dL Final    Cholesterol/HDL Ratio (Risk Factor) 12/21/2022 3.2   Final    Non-HDL 12/21/2022 103  mg/dL Final    LDL Calculated 12/21/2022 88  mg/dL Final    LDL/HDL 12/21/2022 1.9   Final    VLDL 12/21/2022 15  mg/dL Final    Sodium 12/21/2022 144  136 - 145 mmol/L Final    Potassium 12/21/2022 4.2  3.5 - 5.1 mmol/L Final    Chloride 12/21/2022 110 (H)  98 - 107 mmol/L Final    CO2 12/21/2022 26  21 - 32 mmol/L Final    Anion Gap 12/21/2022 12  7 - 16 mmol/L Final    Glucose 12/21/2022 100  74 - 106 mg/dL Final    BUN 12/21/2022 18  7 - 18 mg/dL Final    Creatinine  12/21/2022 1.23  0.70 - 1.30 mg/dL Final    BUN/Creatinine Ratio 12/21/2022 15  6 - 20 Final    Calcium 12/21/2022 8.9  8.5 - 10.1 mg/dL Final    Total Protein 12/21/2022 7.3  6.4 - 8.2 g/dL Final    Albumin 12/21/2022 3.3 (L)  3.5 - 5.0 g/dL Final    Globulin 12/21/2022 4.0  2.0 - 4.0 g/dL Final    A/G Ratio 12/21/2022 0.8   Final    Bilirubin, Total 12/21/2022 0.2  >0.0 - 1.2 mg/dL Final    Alk Phos 12/21/2022 73  45 - 115 U/L Final    ALT 12/21/2022 24  16 - 61 U/L Final    AST 12/21/2022 17  15 - 37 U/L Final    eGFR 12/21/2022 62  >=60 mL/min/1.73m² Final    Hemoglobin A1C 12/21/2022 6.2  4.5 - 6.6 % Final    Estimated Average Glucose 12/21/2022 120  mg/dL Final    WBC 12/21/2022 5.11  4.50 - 11.00 K/uL Final    RBC 12/21/2022 4.94  4.60 - 6.20 M/uL Final    Hemoglobin 12/21/2022 12.9 (L)  13.5 - 18.0 g/dL Final    Hematocrit 12/21/2022 41.9  40.0 - 54.0 % Final    MCV 12/21/2022 84.8  80.0 - 96.0 fL Final    MCH 12/21/2022 26.1 (L)  27.0 - 31.0 pg Final    MCHC 12/21/2022 30.8 (L)  32.0 - 36.0 g/dL Final    RDW 12/21/2022 15.9 (H)  11.5 - 14.5 % Final    Platelet Count 12/21/2022 258  150 - 400 K/uL Final    MPV 12/21/2022 10.3  9.4 - 12.4 fL Final    Neutrophils % 12/21/2022 37.8 (L)  53.0 - 65.0 % Final    Lymphocytes % 12/21/2022 43.8 (H)  27.0 - 41.0 % Final    Monocytes % 12/21/2022 13.5 (H)  2.0 - 6.0 % Final    Eosinophils % 12/21/2022 3.5  1.0 - 4.0 % Final    Basophils % 12/21/2022 1.2 (H)  0.0 - 1.0 % Final    Immature Granulocytes % 12/21/2022 0.2  0.0 - 0.4 % Final    nRBC, Auto 12/21/2022 0.0  <=0.0 % Final    Neutrophils, Abs 12/21/2022 1.93  1.80 - 7.70 K/uL Final    Lymphocytes, Absolute 12/21/2022 2.24  1.00 - 4.80 K/uL Final    Monocytes, Absolute 12/21/2022 0.69  0.00 - 0.80 K/uL Final    Eosinophils, Absolute 12/21/2022 0.18  0.00 - 0.50 K/uL Final    Basophils, Absolute 12/21/2022 0.06  0.00 - 0.20 K/uL Final    Immature Granulocytes, Absolute 12/21/2022 0.01  0.00 - 0.04 K/uL Final     nRBC, Absolute 12/21/2022 0.00  <=0.00 x10e3/uL Final    Diff Type 12/21/2022 Auto   Final     US Lower Extrem Arteries Bilat with PATITO (xpd)  Narrative: EXAMINATION:  US ARTERIAL LOWER EXTREMITY BILAT WITH PATITO (XPD)    CLINICAL HISTORY:  Localized edema    COMPARISON:  None.    FINDINGS:  An arterial Doppler study was performed with interrogation of the bilateral  common femoral artery, deep femoral artery, superficial femoral artery, popliteal artery, and posterior tibial artery. Interrogation includes grayscale, color-flow, and spectral waveform evaluation.    No arterial occlusions demonstrated.  Mildly elevated velocity within the left CFA measuring 142 centimeters/second.  The velocities are otherwise within normal limits and multiphasic waveforms are present throughout. Grayscale imaging demonstrates bilateral atherosclerotic plaque.    Bilateral ankle brachial index was separately requested. Segmental blood pressures were obtained and bilateral ABIs calculated. The ankle to brachial index is 1.02 on the right and 0.79 on the left.  Impression: The ankle to brachial index is 1.02 on the right and 0.79 on the left.  No arterial occlusions demonstrated. Mildly elevated velocity within the left CFA measuring 142 centimeters/second.    Point of Service: [facility]    Electronically signed by: Cal Leslie  Date:    12/23/2022  Time:    11:52      Assessment and Plan (including Health Maintenance)      Problem List  Smart Sets  Document Outside HM   :    Health Maintenance Due   Topic Date Due    Hepatitis C Screening  Never done    COVID-19 Vaccine (1) Never done    TETANUS VACCINE  Never done    Colorectal Cancer Screening  Never done    Shingles Vaccine (1 of 2) Never done    Pneumococcal Vaccines (Age 65+) (1 - PCV) Never done    Influenza Vaccine (1) 09/01/2022       Problem List Items Addressed This Visit          Derm    Rash and nonspecific skin eruption    Relevant Medications     triamcinolone acetonide 0.025% (KENALOG) 0.025 % cream       Cardiac/Vascular    Hyperlipidemia    Relevant Medications    pravastatin (PRAVACHOL) 40 MG tablet    Other Relevant Orders    Lipid Panel    CBC Auto Differential    Comprehensive Metabolic Panel    Hypertension - Primary    Relevant Medications    amLODIPine (NORVASC) 2.5 MG tablet    metoprolol succinate (TOPROL-XL) 100 MG 24 hr tablet    Other Relevant Orders    Lipid Panel    CBC Auto Differential    Comprehensive Metabolic Panel       Orthopedic    Gout    Relevant Medications    colchicine (COLCRYS) 0.6 mg tablet    predniSONE (DELTASONE) 20 MG tablet     Other Visit Diagnoses       Erectile dysfunction, unspecified erectile dysfunction type        Relevant Medications    sildenafiL (VIAGRA) 50 MG tablet    Muscle spasm        Relevant Medications    tiZANidine (ZANAFLEX) 4 MG tablet    Type 2 diabetes mellitus without complication, without long-term current use of insulin        Relevant Orders    Lipid Panel    CBC Auto Differential    Comprehensive Metabolic Panel    Hemoglobin A1C    Lower extremity edema        Relevant Orders    US Lower Extrem Arteries Bilat with PATITO (xpd)    Callus of foot            Refill medications today  Labs done; will treat as indicated  Monitor feet daily  Moisturize area to left great toe daily  Schedule arterial doppler with ABIs  Follow up about 6 mo     Health Maintenance Topics with due status: Not Due       Topic Last Completion Date    Lipid Panel 12/23/2021       Future Appointments   Date Time Provider Department Center   1/6/2023  1:00 PM [facility] GI ROOM 02 CECI ARIZMENDI [facility]   2/20/2023  8:00 AM AWV NURSE, WATT [facility] RNE[facility] TAZ Myles          Signature:  ARON Caldera  [facility]  [address] [place] [address] [place] [id number]  [phone]    Date of encounter: 12/21/22

## 2022-12-23 ENCOUNTER — HOSPITAL ENCOUNTER (OUTPATIENT)
Dept: RADIOLOGY | Facility: HOSPITAL | Age: 72
Discharge: HOME | End: 2022-12-23
Attending: NURSE PRACTITIONER
Payer: MEDICARE

## 2022-12-23 DIAGNOSIS — R60.0 LOWER EXTREMITY EDEMA: ICD-10-CM

## 2022-12-23 PROCEDURE — 93925 LOWER EXTREMITY STUDY: CPT | Mod: TC

## 2023-01-04 DIAGNOSIS — I73.9 PVD (PERIPHERAL VASCULAR DISEASE): Primary | ICD-10-CM

## 2023-01-06 ENCOUNTER — ANESTHESIA (OUTPATIENT)
Dept: GASTROENTEROLOGY | Facility: HOSPITAL | Age: 73
End: 2023-01-06
Payer: MEDICARE

## 2023-01-06 ENCOUNTER — HOSPITAL ENCOUNTER (OUTPATIENT)
Dept: GASTROENTEROLOGY | Facility: HOSPITAL | Age: 73
Discharge: HOME | End: 2023-01-06
Attending: NURSE PRACTITIONER
Payer: MEDICARE

## 2023-01-06 ENCOUNTER — ANESTHESIA EVENT (OUTPATIENT)
Dept: GASTROENTEROLOGY | Facility: HOSPITAL | Age: 73
End: 2023-01-06
Payer: MEDICARE

## 2023-01-06 VITALS
HEART RATE: 66 BPM | OXYGEN SATURATION: 100 % | DIASTOLIC BLOOD PRESSURE: 52 MMHG | SYSTOLIC BLOOD PRESSURE: 114 MMHG | TEMPERATURE: 97 F | RESPIRATION RATE: 20 BRPM

## 2023-01-06 DIAGNOSIS — Z12.11 SCREENING FOR COLON CANCER: Primary | ICD-10-CM

## 2023-01-06 DIAGNOSIS — Z86.010 PERSONAL HISTORY OF COLONIC POLYPS: ICD-10-CM

## 2023-01-06 DIAGNOSIS — K57.30 DIVERTICULOSIS OF COLON: ICD-10-CM

## 2023-01-06 DIAGNOSIS — K63.5 POLYP OF TRANSVERSE COLON, UNSPECIFIED TYPE: ICD-10-CM

## 2023-01-06 PROCEDURE — 37000009 HC ANESTHESIA EA ADD 15 MINS

## 2023-01-06 PROCEDURE — 25000003 PHARM REV CODE 250

## 2023-01-06 PROCEDURE — 27201423 OPTIME MED/SURG SUP & DEVICES STERILE SUPPLY

## 2023-01-06 PROCEDURE — 27000284 HC CANNULA NASAL

## 2023-01-06 PROCEDURE — D9220A PRA ANESTHESIA: Mod: PT,,,

## 2023-01-06 PROCEDURE — D9220A PRA ANESTHESIA: ICD-10-PCS | Mod: PT,,,

## 2023-01-06 PROCEDURE — 45385 COLONOSCOPY W/LESION REMOVAL: CPT | Mod: PT,,, | Performed by: INTERNAL MEDICINE

## 2023-01-06 PROCEDURE — 88305 TISSUE EXAM BY PATHOLOGIST: CPT | Mod: TC,SUR | Performed by: INTERNAL MEDICINE

## 2023-01-06 PROCEDURE — 27000716 HC OXISENSOR PROBE, ANY SIZE

## 2023-01-06 PROCEDURE — 37000008 HC ANESTHESIA 1ST 15 MINUTES

## 2023-01-06 PROCEDURE — 88305 TISSUE EXAM BY PATHOLOGIST: CPT | Mod: 26,,, | Performed by: PATHOLOGY

## 2023-01-06 PROCEDURE — 88305 SURGICAL PATHOLOGY: ICD-10-PCS | Mod: 26,,, | Performed by: PATHOLOGY

## 2023-01-06 PROCEDURE — 63600175 PHARM REV CODE 636 W HCPCS

## 2023-01-06 PROCEDURE — 45385 PR COLONOSCOPY,REMV LESN,SNARE: ICD-10-PCS | Mod: PT,,, | Performed by: INTERNAL MEDICINE

## 2023-01-06 PROCEDURE — 45385 COLONOSCOPY W/LESION REMOVAL: CPT | Mod: PT | Performed by: INTERNAL MEDICINE

## 2023-01-06 RX ORDER — PROPOFOL 10 MG/ML
VIAL (ML) INTRAVENOUS
Status: DISCONTINUED | OUTPATIENT
Start: 2023-01-06 | End: 2023-01-06

## 2023-01-06 RX ORDER — SODIUM CHLORIDE 0.9 % (FLUSH) 0.9 %
10 SYRINGE (ML) INJECTION
Status: DISCONTINUED | OUTPATIENT
Start: 2023-01-06 | End: 2023-01-07 | Stop reason: HOSPADM

## 2023-01-06 RX ORDER — LIDOCAINE HYDROCHLORIDE 20 MG/ML
INJECTION INTRAVENOUS
Status: DISCONTINUED | OUTPATIENT
Start: 2023-01-06 | End: 2023-01-06

## 2023-01-06 RX ADMIN — PROPOFOL 50 MG: 10 INJECTION, EMULSION INTRAVENOUS at 02:01

## 2023-01-06 RX ADMIN — PROPOFOL 40 MG: 10 INJECTION, EMULSION INTRAVENOUS at 02:01

## 2023-01-06 RX ADMIN — PROPOFOL 80 MG: 10 INJECTION, EMULSION INTRAVENOUS at 02:01

## 2023-01-06 RX ADMIN — LIDOCAINE HYDROCHLORIDE 40 MG: 20 INJECTION, SOLUTION INTRAVENOUS at 02:01

## 2023-01-06 RX ADMIN — PROPOFOL 30 MG: 10 INJECTION, EMULSION INTRAVENOUS at 02:01

## 2023-01-06 RX ADMIN — SODIUM CHLORIDE: 9 INJECTION, SOLUTION INTRAVENOUS at 02:01

## 2023-01-06 NOTE — TRANSFER OF CARE
Anesthesia Transfer of Care Note    Patient: Noemi Buckner    Procedure(s) Performed: * Colonoscopy     Patient location: GI    Anesthesia Type: general    Transport from OR: Transported from OR on room air with adequate spontaneous ventilation. Continuous ECG monitoring in transport. Continuous SpO2 monitoring in transport    Post pain: adequate analgesia    Post assessment: no apparent anesthetic complications    Post vital signs: stable    Level of consciousness: sedated and responds to stimulation    Nausea/Vomiting: no nausea/vomiting    Complications: none    Transfer of care protocol was followedComments: Good SV continue, NAD, VSS, RTRN      Last vitals:   Visit Vitals  BP (!) 97/51 (BP Location: Right arm, Patient Position: Lying)   Pulse 75   Temp 36.1 °C (97 °F) (Skin)   Resp 15   SpO2 99%

## 2023-01-06 NOTE — H&P
[facility]  Gastroenterology  H&P    Patient Name: Noemi Buckner  MRN: [id number]  Admission Date: 1/6/2023  Code Status: No Order    Attending Provider: ARON De La O   Primary Care Physician: Corinne Lyles NP  Principal Problem:<principal problem not specified>    Subjective:     History of Present Illness: Pt presents for colonoscopy with history of colon polyps at his last colonoscopy in 2018.    Past Medical History:   Diagnosis Date    Arthritis     Gout a    Hyperlipidemia     Hypertension        Past Surgical History:   Procedure Laterality Date    CIRCUMCISION      KNEE ARTHROSCOPY Right     SHOULDER SURGERY Bilateral        Review of patient's allergies indicates:   Allergen Reactions    Bactrim [sulfamethoxazole-trimethoprim]      Family History    None       Tobacco Use    Smoking status: Never    Smokeless tobacco: Never   Substance and Sexual Activity    Alcohol use: Never    Drug use: Never    Sexual activity: Not on file     Review of Systems   Respiratory: Negative.     Cardiovascular: Negative.    Gastrointestinal: Negative.    Objective:     Vital Signs (Most Recent):  Pulse: 69 (01/06/23 1333)  Resp: 12 (01/06/23 1333)  BP: 127/72 (01/06/23 1333)  SpO2: 97 % (01/06/23 1333) Vital Signs (24h Range):  Pulse:  [69] 69  Resp:  [12] 12  SpO2:  [97 %] 97 %  BP: (127)/(72) 127/72        There is no height or weight on file to calculate BMI.    No intake or output data in the 24 hours ending 01/06/23 1420    Lines/Drains/Airways       Peripheral Intravenous Line  Duration                  Peripheral IV - Single Lumen 01/06/23 1335 22 G Anterior;Distal;Left Upper Arm <1 day                    Physical Exam  Vitals reviewed.   Constitutional:       General: He is not in acute distress.     Appearance: Normal appearance. He is well-developed. He is not ill-appearing.   HENT:      Head: Normocephalic and atraumatic.      Nose: Nose normal.   Eyes:      Pupils: Pupils are equal, round,  and reactive to light.   Cardiovascular:      Rate and Rhythm: Normal rate and regular rhythm.   Pulmonary:      Effort: Pulmonary effort is normal.      Breath sounds: Normal breath sounds. No wheezing.   Abdominal:      General: Abdomen is flat. Bowel sounds are normal. There is no distension.      Palpations: Abdomen is soft.      Tenderness: There is no abdominal tenderness. There is no guarding.   Skin:     General: Skin is warm and dry.      Coloration: Skin is not jaundiced.   Neurological:      Mental Status: He is alert.   Psychiatric:         Attention and Perception: Attention normal.         Mood and Affect: Affect normal.         Speech: Speech normal.         Behavior: Behavior is cooperative.      Comments: Pt was calm while speaking.       Significant Labs:  CBC: No results for input(s): WBC, HGB, HCT, PLT in the last 48 hours.  CMP: No results for input(s): GLU, CALCIUM, ALBUMIN, PROT, NA, K, CO2, CL, BUN, CREATININE, ALKPHOS, ALT, AST, BILITOT in the last 48 hours.    Significant Imaging:  Imaging results within the past 24 hours have been reviewed.    Assessment/Plan:     There are no hospital problems to display for this patient.        Imp: History of colon polyps  Plan: colonoscopy    Wilbur Pruett MD  Gastroenterology  [facility]

## 2023-01-06 NOTE — DISCHARGE INSTRUCTIONS
Procedure Date  1/6/23     Impression  Overall Impression: Colon prep was poor with retained opaque stool present. Diverticula were noted in the sigmoid and descending colon. One small polyp was removed from the transverse colon via hot snare and the site was treated with APC due to poor coagulation via hot snare.     Recommendation    Await pathology results     Repeat colonoscopy in 3 years      High fiber diet  Disp: DC to home in stable condition. Resume diet and activity. No driving x 24 hours. F/U with PCP Dx: History of colon polyps, colon diverticulosis, one polyp was removed on this exam.    NO DRIVING, OPERATING EQUIPMENT, OR SIGNING LEGAL DOCUMENTS FOR 24 HOURS.  THE NURSE WILL CALL YOU WITH YOUR BIOPSY RESULTS IN A FEW DAYS.

## 2023-01-06 NOTE — ANESTHESIA POSTPROCEDURE EVALUATION
Anesthesia Post Evaluation    Patient: Noemi Buckner    Procedure(s) Performed: * Colonoscopy     Final Anesthesia Type: general      Patient location during evaluation: GI PACU  Patient participation: Yes- Able to Participate  Level of consciousness: awake and alert  Post-procedure vital signs: reviewed and stable  Pain management: adequate  Airway patency: patent    PONV status at discharge: No PONV  Anesthetic complications: no      Cardiovascular status: blood pressure returned to baseline and hemodynamically stable  Respiratory status: spontaneous ventilation  Hydration status: euvolemic  Follow-up not needed.  Comments: Pt voices appreciation for care          Vitals Value Taken Time   /57 01/06/23 1457   Temp 36.1 °C (97 °F) 01/06/23 1440   Pulse 67 01/06/23 1459   Resp 18 01/06/23 1459   SpO2 100 % 01/06/23 1459   Vitals shown include unvalidated device data.      No case tracking events are documented in the log.      Pain/Vladislav Score: Vladislav Score: 8 (1/6/2023  2:44 PM)

## 2023-01-06 NOTE — ANESTHESIA PREPROCEDURE EVALUATION
01/06/2023  Noemi Buckner is a 72 y.o., male.  Current Outpatient Medications   Medication Sig    amLODIPine (NORVASC) 2.5 MG tablet Take 1 tablet (2.5 mg total) by mouth once daily.    clindamycin (CLEOCIN) 300 MG capsule Take 300 mg by mouth 3 (three) times daily.    colchicine (COLCRYS) 0.6 mg tablet Take 1 tablet (0.6 mg total) by mouth daily as needed (gout flare).    doxepin (SINEQUAN) 25 MG capsule Take 2 capsules (50 mg total) by mouth nightly.    HYDROcodone-acetaminophen (NORCO) 5-325 mg per tablet Take 1 tablet by mouth.    metoprolol succinate (TOPROL-XL) 100 MG 24 hr tablet Take 1 tablet (100 mg total) by mouth once daily.    pravastatin (PRAVACHOL) 40 MG tablet Take 1 tablet (40 mg total) by mouth nightly.    predniSONE (DELTASONE) 20 MG tablet Take 1 tablet (20 mg total) by mouth once daily.    sildenafiL (VIAGRA) 50 MG tablet Take 1 tablet (50 mg total) by mouth as needed for Erectile Dysfunction.    tiZANidine (ZANAFLEX) 4 MG tablet Take 1 tablet (4 mg total) by mouth every 6 (six) hours as needed (muscle spasm).    triamcinolone acetonide 0.025% (KENALOG) 0.025 % cream Apply topically 2 (two) times daily.     No current facility-administered medications for this encounter.     Active Ambulatory Problems     Diagnosis Date Noted    Hyperlipidemia     Hypertension     Gout     Rash and nonspecific skin eruption 07/28/2022    Personal history of colonic polyps 12/14/2022     Resolved Ambulatory Problems     Diagnosis Date Noted    No Resolved Ambulatory Problems     Past Medical History:   Diagnosis Date    Arthritis      Past Surgical History:   Procedure Laterality Date    CIRCUMCISION      KNEE ARTHROSCOPY Right     SHOULDER SURGERY Bilateral          Pre-op Assessment    I have reviewed the Patient Summary Reports.     I have reviewed the Nursing Notes. I have  reviewed the NPO Status.   I have reviewed the Medications.     Review of Systems  Anesthesia Hx:  No problems with previous Anesthesia  Denies Family Hx of Anesthesia complications.   Denies Personal Hx of Anesthesia complications.   Social:  Non-Smoker, No Alcohol Use    Hematology/Oncology:  Hematology Normal   Oncology Normal     EENT/Dental:EENT/Dental Normal   Cardiovascular:   Hypertension hyperlipidemia    Pulmonary:  Pulmonary Normal    Renal/:  Renal/ Normal     Hepatic/GI:   Bowel Prep. Hx of colonic polyps    Musculoskeletal:   Arthritis  Gout    Neurological:  Neurology Normal    Endocrine:  Endocrine Normal    Dermatological:  Skin Normal    Psych:  Psychiatric Normal           Chemistry        Component Value Date/Time     12/21/2022 0955    K 4.2 12/21/2022 0955     (H) 12/21/2022 0955    CO2 26 12/21/2022 0955    BUN 18 12/21/2022 0955    CREATININE 1.23 12/21/2022 0955     12/21/2022 0955        Component Value Date/Time    CALCIUM 8.9 12/21/2022 0955    ALKPHOS 73 12/21/2022 0955    AST 17 12/21/2022 0955    ALT 24 12/21/2022 0955    BILITOT 0.2 12/21/2022 0955    EGFRNONAA 49 (L) 12/23/2021 1056        Lab Results   Component Value Date    WBC 5.11 12/21/2022    HGB 12.9 (L) 12/21/2022    HCT 41.9 12/21/2022    MCV 84.8 12/21/2022     12/21/2022           Physical Exam  General: Well nourished, Cooperative, Alert and Oriented    Airway:  Mallampati: III   Mouth Opening: Small, but > 3cm  TM Distance: Normal  Tongue: Normal  Neck ROM: Normal ROM    Dental:  Intact    Chest/Lungs:  Normal Respiratory Rate        Anesthesia Plan  Type of Anesthesia, risks & benefits discussed:    Anesthesia Type: Gen Natural Airway  Intra-op Monitoring Plan: Standard ASA Monitors  Post Op Pain Control Plan: multimodal analgesia  Induction:  IV  Informed Consent: Informed consent signed with the Patient and all parties understand the risks and agree with anesthesia plan.  All questions  answered. Patient consented to blood products? Yes  ASA Score: 2  Day of Surgery Review of History & Physical: H&P Update referred to the surgeon/provider.I have interviewed and examined the patient. I have reviewed the patient's H&P dated: There are no significant changes.     Ready For Surgery From Anesthesia Perspective.     .

## 2023-01-09 LAB
ESTROGEN SERPL-MCNC: NORMAL PG/ML
INSULIN SERPL-ACNC: NORMAL U[IU]/ML
LAB AP GROSS DESCRIPTION: NORMAL
LAB AP LABORATORY NOTES: NORMAL
T3RU NFR SERPL: NORMAL %

## 2023-01-12 ENCOUNTER — TELEPHONE (OUTPATIENT)
Dept: GASTROENTEROLOGY | Facility: CLINIC | Age: 73
End: 2023-01-12
Payer: MEDICARE

## 2023-01-19 ENCOUNTER — OFFICE VISIT (OUTPATIENT)
Dept: SURGERY | Facility: CLINIC | Age: 73
End: 2023-01-19
Payer: MEDICARE

## 2023-01-19 DIAGNOSIS — I73.9 PVD (PERIPHERAL VASCULAR DISEASE): ICD-10-CM

## 2023-01-19 PROCEDURE — 99213 OFFICE O/P EST LOW 20 MIN: CPT | Mod: PBBFAC | Performed by: SURGERY

## 2023-01-19 PROCEDURE — 99213 PR OFFICE/OUTPT VISIT, EST, LEVL III, 20-29 MIN: ICD-10-PCS | Mod: S$PBB,,, | Performed by: SURGERY

## 2023-01-19 PROCEDURE — 99213 OFFICE O/P EST LOW 20 MIN: CPT | Mod: S$PBB,,, | Performed by: SURGERY

## 2023-01-19 NOTE — PROGRESS NOTES
Subjective:       Patient ID: Noemi Buckner is a 72 y.o. male.    Chief Complaint: Referral ( Dr. York)  Patient relates an episode we had some unusual feelings in his foot and foot: The left.  Had some arterial Doppler studies ABIs on the right 1.02 on the left 0.79 did have a small wound on his great toe on the left is subsequently completely healed.  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  Past Medical History:   Diagnosis Date    Arthritis     Gout a    Hyperlipidemia     Hypertension       Past Surgical History:   Procedure Laterality Date    CIRCUMCISION      KNEE ARTHROSCOPY Right     SHOULDER SURGERY Bilateral        reports that he has never smoked. He has never used smokeless tobacco. He reports that he does not drink alcohol and does not use drugs.   HPI  Review of Systems      Objective:      There were no vitals taken for this visit.   Physical Exam  Constitutional:       Appearance: Normal appearance.   Cardiovascular:      Rate and Rhythm: Normal rate.      Comments: Left foot warm normal capillary refill normal motor and sensory function no wounds no tissue loss  Pulmonary:      Effort: Pulmonary effort is normal.   Skin:     General: Skin is warm and dry.      Capillary Refill: Capillary refill takes less than 2 seconds.   Neurological:      General: No focal deficit present.      Mental Status: He is alert.         Assessment:       1. PVD (peripheral vascular disease)          Plan:       Patient has mild peripheral vascular disease of the left leg with an PATITO 0.79 right PATITO is normal at 1.02.  Educated the patient no significant claudication symptoms follow-up in a year with ABIs

## 2023-01-31 ENCOUNTER — OFFICE VISIT (OUTPATIENT)
Dept: FAMILY MEDICINE | Facility: CLINIC | Age: 73
End: 2023-01-31
Payer: MEDICARE

## 2023-01-31 VITALS
OXYGEN SATURATION: 97 % | HEART RATE: 76 BPM | HEIGHT: 71 IN | RESPIRATION RATE: 18 BRPM | DIASTOLIC BLOOD PRESSURE: 70 MMHG | BODY MASS INDEX: 30.41 KG/M2 | SYSTOLIC BLOOD PRESSURE: 110 MMHG | TEMPERATURE: 97 F | WEIGHT: 217.19 LBS

## 2023-01-31 DIAGNOSIS — M79.672 PAIN IN LEFT FOOT: ICD-10-CM

## 2023-01-31 DIAGNOSIS — M10.00 ACUTE IDIOPATHIC GOUT, UNSPECIFIED SITE: Primary | ICD-10-CM

## 2023-01-31 DIAGNOSIS — E11.9 TYPE 2 DIABETES MELLITUS WITHOUT COMPLICATION, WITHOUT LONG-TERM CURRENT USE OF INSULIN: ICD-10-CM

## 2023-01-31 PROCEDURE — 99213 PR OFFICE/OUTPT VISIT, EST, LEVL III, 20-29 MIN: ICD-10-PCS | Mod: ,,, | Performed by: NURSE PRACTITIONER

## 2023-01-31 PROCEDURE — 99213 OFFICE O/P EST LOW 20 MIN: CPT | Mod: ,,, | Performed by: NURSE PRACTITIONER

## 2023-01-31 PROCEDURE — 96372 PR INJECTION,THERAP/PROPH/DIAG2ST, IM OR SUBCUT: ICD-10-PCS | Mod: ,,, | Performed by: NURSE PRACTITIONER

## 2023-01-31 PROCEDURE — 96372 THER/PROPH/DIAG INJ SC/IM: CPT | Mod: ,,, | Performed by: NURSE PRACTITIONER

## 2023-01-31 RX ORDER — DEXAMETHASONE SODIUM PHOSPHATE 4 MG/ML
4 INJECTION, SOLUTION INTRA-ARTICULAR; INTRALESIONAL; INTRAMUSCULAR; INTRAVENOUS; SOFT TISSUE
Status: COMPLETED | OUTPATIENT
Start: 2023-01-31 | End: 2023-01-31

## 2023-01-31 RX ORDER — COLCHICINE 0.6 MG/1
0.6 TABLET ORAL 2 TIMES DAILY
Qty: 14 TABLET | Refills: 0 | Status: SHIPPED | OUTPATIENT
Start: 2023-01-31 | End: 2023-05-04

## 2023-01-31 RX ORDER — METHYLPREDNISOLONE ACETATE 40 MG/ML
40 INJECTION, SUSPENSION INTRA-ARTICULAR; INTRALESIONAL; INTRAMUSCULAR; SOFT TISSUE
Status: COMPLETED | OUTPATIENT
Start: 2023-01-31 | End: 2023-01-31

## 2023-01-31 RX ADMIN — METHYLPREDNISOLONE ACETATE 40 MG: 40 INJECTION, SUSPENSION INTRA-ARTICULAR; INTRALESIONAL; INTRAMUSCULAR; SOFT TISSUE at 09:01

## 2023-01-31 RX ADMIN — DEXAMETHASONE SODIUM PHOSPHATE 4 MG: 4 INJECTION, SOLUTION INTRA-ARTICULAR; INTRALESIONAL; INTRAMUSCULAR; INTRAVENOUS; SOFT TISSUE at 09:01

## 2023-01-31 NOTE — PROGRESS NOTES
"   ARON Caldera   [facility] [address] [place] [address]  [phone]      PATIENT NAME: Noemi Buckner  : 1950  DATE: 23  MRN: [id number]      Billing Provider: ARON Caldera  Level of Service:   Patient PCP Information       Provider PCP Type    ARON Delcid General            Reason for Visit / Chief Complaint: Foot Pain (Pt states he is having a gout flare up in his left foot x 2 weeks)       Update PCP  Update Chief Complaint         History of Present Illness / Problem Focused Workflow     72 year old male presents with complaints of left anterior foot pain x 2 weeks  Reports pain has progressively gotten worse  States he has long hx of gout and feels like a "flare up"  Left lower ext swollen as well  Reports he was recently seen by rheumatology and started new medication    Hx of hypertension, hyperlipidemia, ED, arthritis, DM    Review of Systems     Review of Systems   Constitutional:  Negative for activity change and unexpected weight change.   HENT:  Negative for hearing loss, rhinorrhea and trouble swallowing.    Eyes:  Negative for discharge and visual disturbance.   Respiratory:  Negative for chest tightness and wheezing.    Cardiovascular:  Positive for leg swelling. Negative for chest pain and palpitations.   Gastrointestinal:  Negative for blood in stool, constipation, diarrhea and vomiting.   Endocrine: Negative for polydipsia and polyuria.   Genitourinary:  Negative for difficulty urinating, hematuria and urgency.   Musculoskeletal:  Positive for arthralgias. Negative for gait problem, joint swelling and neck pain.        Left foot pain   Skin:  Positive for rash (occasional face rash).        Old callus to left great toe; no open area   Neurological:  Negative for weakness and headaches.   Psychiatric/Behavioral:  Negative for confusion and dysphoric mood.      Medical / Social / Family History "     Past Medical History:   Diagnosis Date    Arthritis     Gout a    Hyperlipidemia     Hypertension        Past Surgical History:   Procedure Laterality Date    CIRCUMCISION      KNEE ARTHROSCOPY Right     SHOULDER SURGERY Bilateral        Social History    reports that he has never smoked. He has been exposed to tobacco smoke. He has never used smokeless tobacco. He reports that he does not drink alcohol and does not use drugs.    Family History  's family history is not on file.    Medications and Allergies     Medications  Outpatient Medications Marked as Taking for the 1/31/23 encounter (Office Visit) with ARON Caldera   Medication Sig Dispense Refill    amLODIPine (NORVASC) 2.5 MG tablet Take 1 tablet (2.5 mg total) by mouth once daily. 90 tablet 1    clindamycin (CLEOCIN) 300 MG capsule Take 300 mg by mouth 3 (three) times daily.      doxepin (SINEQUAN) 25 MG capsule Take 2 capsules (50 mg total) by mouth nightly. 30 capsule 3    metoprolol succinate (TOPROL-XL) 100 MG 24 hr tablet Take 1 tablet (100 mg total) by mouth once daily. 90 tablet 1    pravastatin (PRAVACHOL) 40 MG tablet Take 1 tablet (40 mg total) by mouth nightly. 90 tablet 1    predniSONE (DELTASONE) 20 MG tablet Take 1 tablet (20 mg total) by mouth once daily. 90 tablet 1    sildenafiL (VIAGRA) 50 MG tablet Take 1 tablet (50 mg total) by mouth as needed for Erectile Dysfunction. 30 tablet 0    tiZANidine (ZANAFLEX) 4 MG tablet Take 1 tablet (4 mg total) by mouth every 6 (six) hours as needed (muscle spasm). 90 tablet 1    triamcinolone acetonide 0.025% (KENALOG) 0.025 % cream Apply topically 2 (two) times daily. 80 g 1    [DISCONTINUED] colchicine (COLCRYS) 0.6 mg tablet Take 1 tablet (0.6 mg total) by mouth daily as needed (gout flare). 90 tablet 1       Allergies  Review of patient's allergies indicates:   Allergen Reactions    Allopurinol     Bactrim [sulfamethoxazole-trimethoprim]        Physical Examination     Vitals:     01/31/23 0914   BP: 110/70   Pulse: 76   Resp: 18   Temp: 97.1 °F (36.2 °C)     Physical Exam  Constitutional:       General: He is not in acute distress.  HENT:      Head: Normocephalic.      Nose: Nose normal.      Mouth/Throat:      Mouth: Mucous membranes are moist.   Eyes:      Extraocular Movements: Extraocular movements intact.   Cardiovascular:      Rate and Rhythm: Normal rate.   Pulmonary:      Effort: Pulmonary effort is normal. No respiratory distress.   Abdominal:      General: Bowel sounds are normal.      Palpations: Abdomen is soft.   Musculoskeletal:         General: Swelling and tenderness present. No signs of injury. Normal range of motion.      Cervical back: Neck supple.      Right lower leg: Edema present.      Left lower leg: Edema present.   Skin:     General: Skin is warm.      Comments: Old callus to lateral great toe area; no open area   Neurological:      Mental Status: He is alert and oriented to person, place, and time.   Psychiatric:         Behavior: Behavior normal.         Imaging / Labs     No visits with results within 1 Day(s) from this visit.   Latest known visit with results is:   Hospital Outpatient Visit on 01/06/2023   Component Date Value Ref Range Status    Case Report 01/06/2023    Final                    Value:Surgical Pathology                                Case: [id number]                                   Authorizing Provider:  Wilbur Pruett MD      Collected:           01/06/2023 02:29 PM          Ordering Location:     [facility] - [facility]       Received:            01/06/2023 03:17 PM          Pathologist:           Rony Canseco III, MD                                                                           Specimen:    Intestine Large, Transverse Colon, a. transverse colon polyp                               Final Diagnosis 01/06/2023    Final                    Value:This  result contains rich text formatting which cannot be displayed here.    Gross Description 01/06/2023    Final                    Value:This result contains rich text formatting which cannot be displayed here.    Microscopic Description 01/06/2023    Final                    Value:This result contains rich text formatting which cannot be displayed here.    Laboratory Notes 01/06/2023    Final                    Value:This result contains rich text formatting which cannot be displayed here.     Colonoscopy  Narrative: Table formatting from the original result was not included.  Procedure Date  1/6/23    Impression  Overall   Impression:  Colon prep was poor with retained opaque stool   present. Diverticula were noted in the sigmoid and descending colon. One   small polyp was removed from the transverse colon via hot snare and the   site was treated with APC due to poor coagulation via hot snare.    Recommendation   Await pathology results    Repeat colonoscopy in 3 years     High fiber diet  Disp: DC to home in stable condition. Resume diet and activity. No driving   x 24 hours. F/U with PCP Dx: History of colon polyps, colon   diverticulosis, one polyp was removed on this exam.    Indication  Personal history of colonic polyps    Providers  Khalida Yeh Technician   [facility], CRNA MARCIN Giron, RN Registered Nurse   Wilbur Pruett MD Proceduralist     Medications  Moderate sedation administered by anesthesia staff - See anesthesia   record.    Preprocedure  A history and physical has been performed, and patient medication   allergies have been reviewed. The patient's tolerance of previous   anesthesia has been reviewed. The risks and benefits of the procedure and   the sedation options and risks were discussed with the patient. All   questions were answered and informed consent obtained.    ASA Score: ASA 2 - Patient with mild systemic disease with no functional   limitations  Mallampati  Airway Score: II (hard and soft palate, upper portion of   tonsils anduvula visible)    Details of the Procedure  The patient underwent monitored anesthesia care, which was administered by   an anesthesia professional. The patient's heart rate, blood pressure,   level of consciousness, respirations, oxygen, ECG and ETCO2 were monitored   throughout the procedure. A digital rectal exam was performed. A perianal   exam was performed. The scope was introduced through the anus and advanced   to the cecum. Retroflexion was performed in the rectum. The quality of   bowel preparation was evaluated using the [place] Bowel Preparation Scale   with scores of: right colon = 2, transverse colon = 1, left colon = 1. The   total BBPS score was 4. Bowel prep was not adequate. The patient's   estimated blood loss was minimal (<5 mL). The procedure was not difficult.   The patient tolerated the procedure well. There were no apparent   complications.     Scope: Colonoscope  Scope Serial: [id number]    Events  Procedure Events   Event Event Time     Procedure Events   Event Event Time   ENDO SCOPE IN TIME 1/6/2023  2:24 PM   ENDO CECUM REACHED 1/6/2023  2:36 PM   ENDO SCOPE OUT TIME 1/6/2023  2:40 PM     CECAL WITHDRAWAL TIME: 3m 42s    Findings  One polyp in the transverse colon; removed by hot snare  Diverticula in the descending colon and sigmoid colon; no bleeding was   identified      Assessment and Plan (including Health Maintenance)      Problem List  Smart Sets  Document Outside HM   :    Health Maintenance Due   Topic Date Due    Diabetes Urine Screening  Never done    Foot Exam  Never done    TETANUS VACCINE  Never done    Shingles Vaccine (1 of 2) Never done    Eye Exam  06/23/2022       Problem List Items Addressed This Visit          Endocrine    Type 2 diabetes mellitus without complication, without long-term current use of insulin       Orthopedic    Gout - Primary    Relevant Medications    colchicine (COLCRYS) 0.6 mg  tablet     Other Visit Diagnoses       Pain in left foot        Relevant Medications    colchicine (COLCRYS) 0.6 mg tablet    methylPREDNISolone acetate injection 40 mg (Completed)    dexAMETHasone injection 4 mg (Completed)          Treat for pain and gout inflammation   Increase water intake  Moisturize area to left great toe daily  Follow up if symptoms fail to improve    Health Maintenance Topics with due status: Not Due       Topic Last Completion Date    Lipid Panel 12/21/2022    Hemoglobin A1c 12/21/2022    Colorectal Cancer Screening 01/06/2023    Low Dose Statin 01/31/2023       Future Appointments   Date Time Provider Department Center   2/20/2023  8:00 AM AWV NURSE, [facility]   1/16/2024 10:00 [facility]           Signature:  ARON Caldera  [facility]  [address] [place] [address] [place] [address]  [phone]    Date of encounter: 1/31/23

## 2023-02-15 ENCOUNTER — PATIENT OUTREACH (OUTPATIENT)
Dept: ADMINISTRATIVE | Facility: HOSPITAL | Age: 73
End: 2023-02-15

## 2023-05-04 ENCOUNTER — OFFICE VISIT (OUTPATIENT)
Dept: FAMILY MEDICINE | Facility: CLINIC | Age: 73
End: 2023-05-04
Payer: MEDICARE

## 2023-05-04 VITALS
BODY MASS INDEX: 30.1 KG/M2 | TEMPERATURE: 98 F | RESPIRATION RATE: 18 BRPM | DIASTOLIC BLOOD PRESSURE: 76 MMHG | HEIGHT: 71 IN | SYSTOLIC BLOOD PRESSURE: 120 MMHG | HEART RATE: 88 BPM | OXYGEN SATURATION: 97 % | WEIGHT: 215 LBS

## 2023-05-04 DIAGNOSIS — M10.9 ACUTE GOUT, UNSPECIFIED CAUSE, UNSPECIFIED SITE: ICD-10-CM

## 2023-05-04 DIAGNOSIS — M25.562 ACUTE PAIN OF LEFT KNEE: Primary | ICD-10-CM

## 2023-05-04 PROCEDURE — 99213 OFFICE O/P EST LOW 20 MIN: CPT | Mod: ,,, | Performed by: NURSE PRACTITIONER

## 2023-05-04 PROCEDURE — 99213 PR OFFICE/OUTPT VISIT, EST, LEVL III, 20-29 MIN: ICD-10-PCS | Mod: ,,, | Performed by: NURSE PRACTITIONER

## 2023-05-04 PROCEDURE — 96372 THER/PROPH/DIAG INJ SC/IM: CPT | Mod: ,,, | Performed by: NURSE PRACTITIONER

## 2023-05-04 PROCEDURE — 96372 PR INJECTION,THERAP/PROPH/DIAG2ST, IM OR SUBCUT: ICD-10-PCS | Mod: ,,, | Performed by: NURSE PRACTITIONER

## 2023-05-04 RX ORDER — PREDNISONE 50 MG/1
50 TABLET ORAL DAILY
Qty: 5 TABLET | Refills: 0 | Status: SHIPPED | OUTPATIENT
Start: 2023-05-04 | End: 2023-12-21

## 2023-05-04 RX ORDER — DEXAMETHASONE SODIUM PHOSPHATE 4 MG/ML
4 INJECTION, SOLUTION INTRA-ARTICULAR; INTRALESIONAL; INTRAMUSCULAR; INTRAVENOUS; SOFT TISSUE
Status: COMPLETED | OUTPATIENT
Start: 2023-05-04 | End: 2023-05-04

## 2023-05-04 RX ORDER — COLCHICINE 0.6 MG/1
0.6 TABLET ORAL DAILY
Qty: 30 TABLET | Refills: 5 | Status: SHIPPED | OUTPATIENT
Start: 2023-05-04 | End: 2023-07-14 | Stop reason: SDUPTHER

## 2023-05-04 RX ORDER — CLONAZEPAM 0.5 MG/1
TABLET ORAL
COMMUNITY
Start: 2023-01-18

## 2023-05-04 RX ORDER — DOXEPIN HYDROCHLORIDE 100 MG/1
CAPSULE ORAL
COMMUNITY
Start: 2023-04-18

## 2023-05-04 RX ORDER — METHYLPREDNISOLONE ACETATE 40 MG/ML
40 INJECTION, SUSPENSION INTRA-ARTICULAR; INTRALESIONAL; INTRAMUSCULAR; SOFT TISSUE
Status: COMPLETED | OUTPATIENT
Start: 2023-05-04 | End: 2023-05-04

## 2023-05-04 RX ADMIN — METHYLPREDNISOLONE ACETATE 40 MG: 40 INJECTION, SUSPENSION INTRA-ARTICULAR; INTRALESIONAL; INTRAMUSCULAR; SOFT TISSUE at 03:05

## 2023-05-04 RX ADMIN — DEXAMETHASONE SODIUM PHOSPHATE 4 MG: 4 INJECTION, SOLUTION INTRA-ARTICULAR; INTRALESIONAL; INTRAMUSCULAR; INTRAVENOUS; SOFT TISSUE at 03:05

## 2023-05-04 NOTE — PROGRESS NOTES
Reviewed care gaps with pt.   Health Maintenance Due   Topic Date Due    Diabetes Urine Screening  Never done    Foot Exam  Never done    TETANUS VACCINE  Never done    Shingles Vaccine (1 of 2) Never done    Eye Exam  06/23/2022     Pt states he schedules his own eye exams and will schedule one this year.   Pt unable to complete all vaccines today due to administration of steroids IM.

## 2023-05-29 PROBLEM — M25.562 ACUTE PAIN OF LEFT KNEE: Status: ACTIVE | Noted: 2023-05-29

## 2023-05-29 NOTE — PROGRESS NOTES
"   ARON Caldera   [facility]  [id number] [place] [address]  [phone]      PATIENT NAME: Noemi Buckner  : 1950  DATE: 23  MRN: [id number]      Billing Provider: ARON Caldera  Level of Service: [place] OFFICE/OUTPT VISIT, EST, LEVL III, 20-29 MIN  Patient PCP Information       Provider PCP Type    ARON Delcid General            Reason for Visit / Chief Complaint: Knee Pain (Pt states his L knee is hurting. /States he knows it is a gout flare up. /States it started hurting 2 weeks ago and has continued to worsen. /Pt has not had a recent Xray. )       Update PCP  Update Chief Complaint         History of Present Illness / Problem Focused Workflow     72 year old male presents with complaints of left knee pain  Complaints hs is having a "gout flare up "  Reports it started about 2 weeks ago and has gotten worse    X-ray unavailable today    Hx of hypertension, hyperlipidemia, ED, arthritis, DM    Review of Systems     Review of Systems   Constitutional:  Negative for activity change and unexpected weight change.   HENT:  Negative for hearing loss, rhinorrhea and trouble swallowing.    Eyes:  Negative for discharge and visual disturbance.   Respiratory:  Negative for chest tightness and wheezing.    Cardiovascular:  Negative for chest pain and palpitations.   Gastrointestinal:  Negative for blood in stool, constipation, diarrhea and vomiting.   Endocrine: Negative for polydipsia and polyuria.   Genitourinary:  Negative for difficulty urinating, hematuria and urgency.   Musculoskeletal:  Positive for arthralgias. Negative for gait problem, joint swelling and neck pain.        Left knee pain   Skin:  Positive for rash (occasional face rash).        Old callus to left great toe; no open area   Neurological:  Negative for weakness and headaches.   Psychiatric/Behavioral:  Negative for confusion and dysphoric mood.      Medical / " Social / Family History     Past Medical History:   Diagnosis Date    Arthritis     Gout a    Hyperlipidemia     Hypertension        Past Surgical History:   Procedure Laterality Date    CIRCUMCISION      KNEE ARTHROSCOPY Right     SHOULDER SURGERY Bilateral        Social History    reports that he has never smoked. He has been exposed to tobacco smoke. He has never used smokeless tobacco. He reports that he does not drink alcohol and does not use drugs.    Family History  's family history is not on file.    Medications and Allergies     Medications  Outpatient Medications Marked as Taking for the 5/4/23 encounter (Office Visit) with ARON Caldera   Medication Sig Dispense Refill    amLODIPine (NORVASC) 2.5 MG tablet Take 1 tablet (2.5 mg total) by mouth once daily. 90 tablet 1    clonazePAM (KLONOPIN) 0.5 MG tablet       doxepin (SINEQUAN) 100 MG capsule Take by mouth.      doxepin (SINEQUAN) 25 MG capsule Take 2 capsules (50 mg total) by mouth nightly. 30 capsule 3    HYDROcodone-acetaminophen (NORCO) 5-325 mg per tablet Take 1 tablet by mouth.      metoprolol succinate (TOPROL-XL) 100 MG 24 hr tablet Take 1 tablet (100 mg total) by mouth once daily. 90 tablet 1    pravastatin (PRAVACHOL) 40 MG tablet Take 1 tablet (40 mg total) by mouth nightly. 90 tablet 1    sildenafiL (VIAGRA) 50 MG tablet Take 1 tablet (50 mg total) by mouth as needed for Erectile Dysfunction. 30 tablet 0    tiZANidine (ZANAFLEX) 4 MG tablet Take 1 tablet (4 mg total) by mouth every 6 (six) hours as needed (muscle spasm). 90 tablet 1    triamcinolone acetonide 0.025% (KENALOG) 0.025 % cream Apply topically 2 (two) times daily. 80 g 1    [DISCONTINUED] colchicine (COLCRYS) 0.6 mg tablet Take 1 tablet (0.6 mg total) by mouth 2 (two) times daily. 14 tablet 0       Allergies  Review of patient's allergies indicates:   Allergen Reactions    Allopurinol     Bactrim [sulfamethoxazole-trimethoprim]        Physical Examination     Vitals:     05/04/23 1407   BP: 120/76   Pulse: 88   Resp: 18   Temp: 98.4 °F (36.9 °C)     Physical Exam  Constitutional:       General: He is not in acute distress.  HENT:      Head: Normocephalic.      Nose: Nose normal.      Mouth/Throat:      Mouth: Mucous membranes are moist.   Eyes:      Extraocular Movements: Extraocular movements intact.   Cardiovascular:      Rate and Rhythm: Normal rate.   Pulmonary:      Effort: Pulmonary effort is normal. No respiratory distress.   Abdominal:      General: Bowel sounds are normal.      Palpations: Abdomen is soft.   Musculoskeletal:         General: Swelling present. No tenderness or signs of injury. Normal range of motion.      Cervical back: Neck supple.   Skin:     General: Skin is warm.      Comments: Old callus to lateral great toe area; no open area   Neurological:      Mental Status: He is alert and oriented to person, place, and time.   Psychiatric:         Behavior: Behavior normal.         Imaging / Labs     No visits with results within 1 Day(s) from this visit.   Latest known visit with results is:   Patient Outreach on 02/15/2023   Component Date Value Ref Range Status    CRC Recommendation External 02/26/2018 Repeat colonoscopy in 10 years   Final     Colonoscopy  Narrative: Table formatting from the original result was not included.  Procedure Date  1/6/23    Impression  Overall   Impression:  Colon prep was poor with retained opaque stool   present. Diverticula were noted in the sigmoid and descending colon. One   small polyp was removed from the transverse colon via hot snare and the   site was treated with APC due to poor coagulation via hot snare.    Recommendation   Await pathology results    Repeat colonoscopy in 3 years     High fiber diet  Disp: DC to home in stable condition. Resume diet and activity. No driving   x 24 hours. F/U with PCP Dx: History of colon polyps, colon   diverticulosis, one polyp was removed on this exam.    Indication  Personal  history of colonic polyps    Providers  Khalida Yeh Technician   [facility], CRNA CRNA   Yaz Giron, RN Registered Nurse   Wilbur Pruett MD Proceduralist     Medications  Moderate sedation administered by anesthesia staff - See anesthesia   record.    Preprocedure  A history and physical has been performed, and patient medication   allergies have been reviewed. The patient's tolerance of previous   anesthesia has been reviewed. The risks and benefits of the procedure and   the sedation options and risks were discussed with the patient. All   questions were answered and informed consent obtained.    ASA Score: ASA 2 - Patient with mild systemic disease with no functional   limitations  Mallampati Airway Score: II (hard and soft palate, upper portion of   tonsils anduvula visible)    Details of the Procedure  The patient underwent monitored anesthesia care, which was administered by   an anesthesia professional. The patient's heart rate, blood pressure,   level of consciousness, respirations, oxygen, ECG and ETCO2 were monitored   throughout the procedure. A digital rectal exam was performed. A perianal   exam was performed. The scope was introduced through the anus and advanced   to the cecum. Retroflexion was performed in the rectum. The quality of   bowel preparation was evaluated using the [place] Bowel Preparation Scale   with scores of: right colon = 2, transverse colon = 1, left colon = 1. The   total BBPS score was 4. Bowel prep was not adequate. The patient's   estimated blood loss was minimal (<5 mL). The procedure was not difficult.   The patient tolerated the procedure well. There were no apparent   complications.     Scope: Colonoscope  Scope Serial: [id number]    Events  Procedure Events   Event Event Time     Procedure Events   Event Event Time   ENDO SCOPE IN TIME 1/6/2023  2:24 PM   ENDO CECUM REACHED 1/6/2023  2:36 PM   ENDO SCOPE OUT TIME 1/6/2023  2:40 PM     CECAL WITHDRAWAL  TIME: 3m 42s    Findings  One polyp in the transverse colon; removed by hot snare  Diverticula in the descending colon and sigmoid colon; no bleeding was   identified      Assessment and Plan (including Health Maintenance)      Problem List  Smart Sets  Document Outside HM   :    Health Maintenance Due   Topic Date Due    Diabetes Urine Screening  Never done    Foot Exam  Never done    TETANUS VACCINE  Never done    Shingles Vaccine (1 of 2) Never done    Eye Exam  06/23/2022    COVID-19 Vaccine (5 - Moderna series) 02/18/2023       Problem List Items Addressed This Visit          Orthopedic    Gout    Relevant Medications    colchicine (COLCRYS) 0.6 mg tablet    predniSONE (DELTASONE) 50 MG Tab    Acute pain of left knee - Primary    Current Assessment & Plan     Depomedrol, decadron IM  Refill colchicine po  Rest.    Follow up if symptoms fail to improve            Health Maintenance Topics with due status: Not Due       Topic Last Completion Date    Lipid Panel 12/21/2022    Hemoglobin A1c 12/21/2022    Colorectal Cancer Screening 01/06/2023    Low Dose Statin 05/04/2023       Future Appointments   Date Time Provider Department Center   1/16/2024 10:00 [facility]   1/16/2024  1:00 PM Ella Mendez [facility]          Signature:  ARON Caldera  [facility] [address] [place] [address]  [phone]    Date of encounter: 5/4/23  Reviewed the chart and agree with assessment and plan.  Rex Long,

## 2023-06-01 DIAGNOSIS — M62.838 MUSCLE SPASM: ICD-10-CM

## 2023-06-05 RX ORDER — TIZANIDINE 4 MG/1
TABLET ORAL
Qty: 90 TABLET | Refills: 0 | Status: SHIPPED | OUTPATIENT
Start: 2023-06-05

## 2023-07-14 ENCOUNTER — APPOINTMENT (OUTPATIENT)
Dept: RADIOLOGY | Facility: CLINIC | Age: 73
End: 2023-07-14
Attending: STUDENT IN AN ORGANIZED HEALTH CARE EDUCATION/TRAINING PROGRAM
Payer: MEDICARE

## 2023-07-14 ENCOUNTER — OFFICE VISIT (OUTPATIENT)
Dept: FAMILY MEDICINE | Facility: CLINIC | Age: 73
End: 2023-07-14
Payer: MEDICARE

## 2023-07-14 VITALS
OXYGEN SATURATION: 96 % | HEIGHT: 71 IN | DIASTOLIC BLOOD PRESSURE: 84 MMHG | WEIGHT: 219.38 LBS | SYSTOLIC BLOOD PRESSURE: 134 MMHG | BODY MASS INDEX: 30.71 KG/M2 | HEART RATE: 73 BPM | TEMPERATURE: 98 F | RESPIRATION RATE: 18 BRPM

## 2023-07-14 DIAGNOSIS — R20.0 FACIAL NUMBNESS: ICD-10-CM

## 2023-07-14 DIAGNOSIS — M25.561 ACUTE PAIN OF RIGHT KNEE: ICD-10-CM

## 2023-07-14 DIAGNOSIS — M25.561 ACUTE PAIN OF RIGHT KNEE: Primary | ICD-10-CM

## 2023-07-14 DIAGNOSIS — S83.206A TEAR OF MENISCUS OF RIGHT KNEE, UNSPECIFIED MENISCUS, UNSPECIFIED TEAR TYPE, UNSPECIFIED WHETHER OLD OR CURRENT TEAR: ICD-10-CM

## 2023-07-14 DIAGNOSIS — M1A.09X0 IDIOPATHIC CHRONIC GOUT OF MULTIPLE SITES WITHOUT TOPHUS: ICD-10-CM
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
## 2023-07-14 PROCEDURE — 73564 XR KNEE COMP 4 OR MORE VIEWS RIGHT: ICD-10-PCS | Mod: 26,RT,, | Performed by: STUDENT IN AN ORGANIZED HEALTH CARE EDUCATION/TRAINING PROGRAM

## 2023-07-14 PROCEDURE — 99214 PR OFFICE/OUTPT VISIT, EST, LEVL IV, 30-39 MIN: ICD-10-PCS | Mod: ,,, | Performed by: STUDENT IN AN ORGANIZED HEALTH CARE EDUCATION/TRAINING PROGRAM

## 2023-07-14 PROCEDURE — 73564 X-RAY EXAM KNEE 4 OR MORE: CPT | Mod: TC,RHCUB,FY,RT | Performed by: STUDENT IN AN ORGANIZED HEALTH CARE EDUCATION/TRAINING PROGRAM

## 2023-07-14 PROCEDURE — 99214 OFFICE O/P EST MOD 30 MIN: CPT | Mod: ,,, | Performed by: STUDENT IN AN ORGANIZED HEALTH CARE EDUCATION/TRAINING PROGRAM

## 2023-07-14 PROCEDURE — 73564 X-RAY EXAM KNEE 4 OR MORE: CPT | Mod: 26,RT,, | Performed by: STUDENT IN AN ORGANIZED HEALTH CARE EDUCATION/TRAINING PROGRAM

## 2023-07-14 RX ORDER — SILDENAFIL 100 MG/1
50-100 TABLET, FILM COATED ORAL DAILY PRN
COMMUNITY
Start: 2023-05-15

## 2023-07-14 RX ORDER — HYDROCODONE BITARTRATE AND ACETAMINOPHEN 5; 325 MG/1; MG/1
1 TABLET ORAL EVERY 8 HOURS PRN
Qty: 12 TABLET | Refills: 0 | Status: SHIPPED | OUTPATIENT
Start: 2023-07-14 | End: 2023-12-21

## 2023-07-14 RX ORDER — COLCHICINE 0.6 MG/1
0.6 TABLET ORAL DAILY
Qty: 30 TABLET | Refills: 0 | Status: SHIPPED | OUTPATIENT
Start: 2023-07-14 | End: 2024-01-09 | Stop reason: SDUPTHER

## 2023-07-14 NOTE — PROGRESS NOTES
Progress Note     ERIK PATTEN MD   [address]  [place] [address]     PATIENT NAME: Noemi Buckner  : 1950  DATE: 23  MRN: [id number]      Billing Provider: ERIK PATTEN MD  Level of Service:   Patient PCP Information       Provider PCP Type    Corinne Lyles NP General                Knee Injury (Pt states he fell 2 days ago outside and hurt his R knee. /States he has an old ACL tear in that knee as well as gout. ) and Medication Refill (Pt states he needs a refill on colchicine. /States he is out but mail service has not sent any. )      SUBJECTIVE:     Noemi Buckner is a 73 y.o.male who presents to clinic for Knee Injury (Pt states he fell 2 days ago outside and hurt his R knee. /States he has an old ACL tear in that knee as well as gout. ) and Medication Refill (Pt states he needs a refill on colchicine. /States he is out but mail service has not sent any. )    That approximately 2 days ago he fell outside.  He landed on his back with his right lower extremity folded underneath his body.  He felt a pop in his right knee.  He was able to bear weight but he notes that he has since developed swelling and pain particularly on the inner portion of his knee.  He has had an ACL tear in the past that was treated with just bracing.  He notes that the swelling has improved and the pain has actually improved slightly.  He has some instability.  Patient is also requesting a walking stick prescription for his balance    Of note, patient also has history of gout.  Patient normally has flares in his that, elbows, and knees.  Patient is needing a refill for his routinely prescribed colchicine.    Patient is also concerned about some sensation changes along his right jaw and lip since having dental implants placed approximately one year ago. He has had some return of sensation. Patient would like to be referred to Neurology.        Past Medical History:  has a past  medical history of Arthritis, Gout (a), Hyperlipidemia, and Hypertension.   Past Surgical History:  has a past surgical history that includes Circumcision; Shoulder surgery (Bilateral); and Knee arthroscopy (Right).  Family History: family history is not on file.  Social History:  reports that he has never smoked. He has been exposed to tobacco smoke. He has never used smokeless tobacco. He reports that he does not drink alcohol and does not use drugs.  Allergies:   Review of patient's allergies indicates:   Allergen Reactions    Allopurinol     Bactrim [sulfamethoxazole-trimethoprim]          Current Outpatient Medications:     amLODIPine (NORVASC) 2.5 MG tablet, Take 1 tablet (2.5 mg total) by mouth once daily., Disp: 90 tablet, Rfl: 1    clonazePAM (KLONOPIN) 0.5 MG tablet, , Disp: , Rfl:     doxepin (SINEQUAN) 25 MG capsule, Take 2 capsules (50 mg total) by mouth nightly., Disp: 30 capsule, Rfl: 3    metoprolol succinate (TOPROL-XL) 100 MG 24 hr tablet, Take 1 tablet (100 mg total) by mouth once daily., Disp: 90 tablet, Rfl: 1    pravastatin (PRAVACHOL) 40 MG tablet, Take 1 tablet (40 mg total) by mouth nightly., Disp: 90 tablet, Rfl: 1    predniSONE (DELTASONE) 50 MG Tab, Take 1 tablet (50 mg total) by mouth once daily., Disp: 5 tablet, Rfl: 0    sildenafiL (VIAGRA) 100 MG tablet, Take  mg by mouth daily as needed., Disp: , Rfl:     tiZANidine (ZANAFLEX) 4 MG tablet, TAKE 1 TABLET BY MOUTH EVERY 6  HOURS AS NEEDED FOR MUSCLE SPASM, Disp: 90 tablet, Rfl: 0    triamcinolone acetonide 0.025% (KENALOG) 0.025 % cream, Apply topically 2 (two) times daily., Disp: 80 g, Rfl: 1    colchicine (COLCRYS) 0.6 mg tablet, Take 1 tablet (0.6 mg total) by mouth once daily., Disp: 30 tablet, Rfl: 0    doxepin (SINEQUAN) 100 MG capsule, Take by mouth., Disp: , Rfl:     HYDROcodone-acetaminophen (NORCO) 5-325 mg per tablet, Take 1 tablet by mouth every 8 (eight) hours as needed for Pain., Disp: 12 tablet, Rfl: 0   OBJECTIVE:  "    Vital Signs   /84 (BP Location: Right arm, Patient Position: Sitting, BP Method: Large (Manual))   Pulse 73   Temp 97.7 °F (36.5 °C) (Temporal)   Resp 18   Ht 5' 11" (1.803 m)   Wt 99.5 kg (219 lb 6.4 oz)   SpO2 96%   BMI 30.60 kg/m²     Physical Exam  Constitutional:       General: He is not in acute distress.     Appearance: Normal appearance. He is not ill-appearing.   HENT:      Head: Normocephalic and atraumatic.   Eyes:      Extraocular Movements: Extraocular movements intact.      Pupils: Pupils are equal, round, and reactive to light.   Cardiovascular:      Rate and Rhythm: Normal rate and regular rhythm.      Heart sounds: No murmur heard.    No friction rub. No gallop.   Pulmonary:      Effort: Pulmonary effort is normal. No respiratory distress.      Breath sounds: Normal breath sounds. No wheezing, rhonchi or rales.   Abdominal:      Palpations: Abdomen is soft.      Tenderness: There is no abdominal tenderness. There is no guarding or rebound.   Musculoskeletal:      Comments: Right knee with TTP over medial joint line throughout medial side of knee. Joint effusion present. No warmth or erythema appreciated. Patient with significant laxity and pain with valgus stress test. Patient with pain along medial side of knee with varus stress testing.    Skin:     General: Skin is warm and dry.      Capillary Refill: Capillary refill takes less than 2 seconds.   Neurological:      General: No focal deficit present.      Mental Status: He is alert.   Psychiatric:         Mood and Affect: Mood normal.         Behavior: Behavior normal.       ASSESSMENT/PLAN:     1. Acute pain of right knee  -     X-Ray Knee Complete 4 Or More Views Right; Future; Expected date: 07/14/2023  -     CANE FOR HOME USE  -     MRI Knee Without Contrast Right; Future; Expected date: 07/14/2023    2. Fall, initial encounter    Patient is s/p fall with TTP over medial knee and laxity. Xray shows arthritis. Will order MRI " due to concern for internal derangement. Patient prescribed Norco for pain control.  reviewed. 12 tablets provided.     3. Idiopathic chronic gout of multiple sites without tophus  -     colchicine (COLCRYS) 0.6 mg tablet; Take 1 tablet (0.6 mg total) by mouth once daily.  Dispense: 30 tablet; Refill: 0  His gout is currently well controlled. Will refill colchicine. Prescription sent to pharmacy.     4. Facial numbness  -     Ambulatory referral/consult to Neurology; Future; Expected date: 07/21/2023  Patient with persistent right lower face numbness since dental surgery. Patient requesting referral to neurology. Referral placed.     Other orders  -     HYDROcodone-acetaminophen (NORCO) 5-325 mg per tablet; Take 1 tablet by mouth every 8 (eight) hours as needed for Pain.  Dispense: 12 tablet; Refill: 0        No follow-ups on file.      ERIK PATTEN MD  07/14/2023

## 2023-07-14 NOTE — PROGRESS NOTES
Reviewed care gaps with pt.   Health Maintenance Due   Topic Date Due    Diabetes Urine Screening  Never done    Foot Exam  Never done    TETANUS VACCINE  Never done    Shingles Vaccine (1 of 2) Never done    Eye Exam  06/23/2022    COVID-19 Vaccine (5 - Moderna series) 02/18/2023    Hemoglobin A1c  06/21/2023     Pt states he has had an eye exam this year by Dr. Jeffery in [place]. Will send request for records.      Familia

## 2023-07-17 ENCOUNTER — PATIENT OUTREACH (OUTPATIENT)
Dept: ADMINISTRATIVE | Facility: HOSPITAL | Age: 73
End: 2023-07-17

## 2023-07-17 NOTE — PROGRESS NOTES
07/17/2023   --Chart accessed for:Care Gaps  --Care Gaps addressed:Eye Exam  Outreach made to patient via n/a . (Success) (Left Message) (Unavailable)   Care Everywhere updates requested and reviewed.  Media reports reviewed.  [facility] and [facility] reviewed.  Immunization Database (Immprint/MIXX) reviewed. Vaccinations uploaded: zoster recombinant   Requested ey exam records from [facility]     [facility] Maintenance Due   Topic Date Due    Hepatitis C Screening  Never done    Diabetes Urine Screening  Never done    Pneumococcal Vaccines (Age 65+) (1 - PCV) Never done    Foot Exam  Never done    TETANUS VACCINE  Never done    Eye Exam  06/23/2022    COVID-19 Vaccine (5 - Moderna series) 02/18/2023    Hemoglobin A1c  06/21/2023

## 2023-07-17 NOTE — LETTER
AUTHORIZATION FOR RELEASE OF   CONFIDENTIAL INFORMATION    Dear [facility],    We are seeing Noemi Buckner, date of birth 1950, in the clinic at [facility]. Corinne Lyles NP is the patient's PCP. Noemi Buckner has an outstanding lab/procedure at the time we reviewed his chart. In order to help keep his health information updated, he has authorized us to request the following medical record(s):        (  )  MAMMOGRAM                                      (  )  COLONOSCOPY      (  )  PAP SMEAR                                          (  )  OUTSIDE LAB RESULTS     (  )  DEXA SCAN                                          (X)  EYE EXAM            (  )  FOOT EXAM                                          (  )  ENTIRE RECORD     (  )  OUTSIDE IMMUNIZATIONS                 (  )  _______________         Please fax records to [facility]inator, Glenda Perez, [phone].     If you have any questions, please contact [phone].          Patient Name: Noemi Buckner  : 1950  Patient Phone #: [phone]

## 2023-07-19 ENCOUNTER — PATIENT OUTREACH (OUTPATIENT)
Dept: ADMINISTRATIVE | Facility: HOSPITAL | Age: 73
End: 2023-07-19

## 2023-07-19 NOTE — PROGRESS NOTES
Uploaded pt eye exam from [facility]  Reached out to pt to confirm who he will like to see for his PCP. Pt was not able to talk, but the wife spoke on his behave. Stated pt will like for his PCP to be the doctor. He will see the NP if the dr is not available, but the PCP does not change

## 2023-07-20 ENCOUNTER — PATIENT MESSAGE (OUTPATIENT)
Dept: ADMINISTRATIVE | Facility: HOSPITAL | Age: 73
End: 2023-07-20

## 2023-07-21 ENCOUNTER — PATIENT MESSAGE (OUTPATIENT)
Dept: ADMINISTRATIVE | Facility: HOSPITAL | Age: 73
End: 2023-07-21

## 2023-07-25 ENCOUNTER — HOSPITAL ENCOUNTER (OUTPATIENT)
Dept: RADIOLOGY | Facility: HOSPITAL | Age: 73
Discharge: HOME | End: 2023-07-25
Attending: STUDENT IN AN ORGANIZED HEALTH CARE EDUCATION/TRAINING PROGRAM
Payer: MEDICARE

## 2023-07-25 DIAGNOSIS — M25.561 ACUTE PAIN OF RIGHT KNEE: ICD-10-CM

## 2023-07-25 PROCEDURE — 73721 MRI KNEE WITHOUT CONTRAST RIGHT: ICD-10-PCS | Mod: 26,RT,, | Performed by: RADIOLOGY

## 2023-07-25 PROCEDURE — 73721 MRI JNT OF LWR EXTRE W/O DYE: CPT | Mod: TC,RT

## 2023-07-25 PROCEDURE — 73721 MRI JNT OF LWR EXTRE W/O DYE: CPT | Mod: 26,RT,, | Performed by: RADIOLOGY

## 2023-07-28 NOTE — PROGRESS NOTES
MRI shows meniscal tear. Patient continues to have pain. Will refer to Orthopedics at [facility].

## 2023-07-28 NOTE — PROGRESS NOTES
Discussed results w/pt  Pt v/u.  Pt has seen Ortho with [facility] in the past.  Pt stated you can send referral to [facility]

## 2023-08-09 DIAGNOSIS — Z71.89 COMPLEX CARE COORDINATION: ICD-10-CM

## 2023-08-15 ENCOUNTER — OFFICE VISIT (OUTPATIENT)
Dept: ORTHOPEDICS | Facility: CLINIC | Age: 73
End: 2023-08-15
Payer: MEDICARE

## 2023-08-15 VITALS — WEIGHT: 215 LBS | BODY MASS INDEX: 30.1 KG/M2 | HEIGHT: 71 IN

## 2023-08-15 DIAGNOSIS — S83.206A TEAR OF MENISCUS OF RIGHT KNEE, UNSPECIFIED MENISCUS, UNSPECIFIED TEAR TYPE, UNSPECIFIED WHETHER OLD OR CURRENT TEAR: ICD-10-CM

## 2023-08-15 PROCEDURE — 99213 OFFICE O/P EST LOW 20 MIN: CPT | Mod: PBBFAC | Performed by: ORTHOPAEDIC SURGERY

## 2023-08-15 PROCEDURE — 99214 PR OFFICE/OUTPT VISIT, EST, LEVL IV, 30-39 MIN: ICD-10-PCS | Mod: S$PBB,,, | Performed by: ORTHOPAEDIC SURGERY

## 2023-08-15 PROCEDURE — 99214 OFFICE O/P EST MOD 30 MIN: CPT | Mod: S$PBB,,, | Performed by: ORTHOPAEDIC SURGERY

## 2023-08-15 NOTE — PROGRESS NOTES
CC:   Chief Complaint   Patient presents with    Left Knee - Pain     HAS MRI        PREVIOUS INFO:  Right knee injected by Dr. Norbert Jones August 20, 2020      HISTORY:   8/15/2023    Noemi Buckner  is a 73 y.o. patient I think had a fall in much maybe hyperflex his knee got really aggravated a month or 2 go but is calmed back down to his baseline it sounds like gives him some troubles but not enough to have a total knee replacement he would already discussed that with Dr. Norbert Jones in is not bothering him bad enough for that he states  Patient states he really can not take anti-inflammatories secondary to kidney disease    PAST MEDICAL HISTORY:   Past Medical History:   Diagnosis Date    Arthritis     Gout a    Hyperlipidemia     Hypertension           PAST SURGICAL HISTORY:   Past Surgical History:   Procedure Laterality Date    CIRCUMCISION      KNEE ARTHROSCOPY Right     SHOULDER SURGERY Bilateral           ALLERGIES:   Review of patient's allergies indicates:   Allergen Reactions    Allopurinol     Bactrim [sulfamethoxazole-trimethoprim]         MEDICATIONS :    Current Outpatient Medications:     amLODIPine (NORVASC) 2.5 MG tablet, Take 1 tablet (2.5 mg total) by mouth once daily., Disp: 90 tablet, Rfl: 1    colchicine (COLCRYS) 0.6 mg tablet, Take 1 tablet (0.6 mg total) by mouth once daily., Disp: 30 tablet, Rfl: 0    clonazePAM (KLONOPIN) 0.5 MG tablet, , Disp: , Rfl:     doxepin (SINEQUAN) 100 MG capsule, Take by mouth., Disp: , Rfl:     doxepin (SINEQUAN) 25 MG capsule, Take 2 capsules (50 mg total) by mouth nightly., Disp: 30 capsule, Rfl: 3    HYDROcodone-acetaminophen (NORCO) 5-325 mg per tablet, Take 1 tablet by mouth every 8 (eight) hours as needed for Pain., Disp: 12 tablet, Rfl: 0    metoprolol succinate (TOPROL-XL) 100 MG 24 hr tablet, Take 1 tablet (100 mg total) by mouth once daily., Disp: 90 tablet, Rfl: 1    pravastatin (PRAVACHOL) 40 MG tablet, Take 1 tablet (40 mg total) by  mouth nightly., Disp: 90 tablet, Rfl: 1    predniSONE (DELTASONE) 50 MG Tab, Take 1 tablet (50 mg total) by mouth once daily., Disp: 5 tablet, Rfl: 0    sildenafiL (VIAGRA) 100 MG tablet, Take  mg by mouth daily as needed., Disp: , Rfl:     tiZANidine (ZANAFLEX) 4 MG tablet, TAKE 1 TABLET BY MOUTH EVERY 6  HOURS AS NEEDED FOR MUSCLE SPASM, Disp: 90 tablet, Rfl: 0    triamcinolone acetonide 0.025% (KENALOG) 0.025 % cream, Apply topically 2 (two) times daily., Disp: 80 g, Rfl: 1     SOCIAL HISTORY:   Social History     Socioeconomic History    Marital status:    Tobacco Use    Smoking status: Never     Passive exposure: Past    Smokeless tobacco: Never   Substance and Sexual Activity    Alcohol use: Never    Drug use: Never    Sexual activity: Not Currently        ROS    FAMILY HISTORY: No family history on file.       PHYSICAL EXAM: There were no vitals filed for this visit.            Body mass index is 29.99 kg/m².     In general, this is a well-developed, well-nourished male . The patient is alert, oriented and cooperative.      HEENT:  Normocephalic, atraumatic.  Extraocular movements are intact bilaterally.  The oropharynx is benign.       NECK:  Nontender with good range of motion.      PULMONARY: Respirations are even and non-labored.       CARDIOVASCULAR: Pulses regular by peripheral palpation.       ABDOMEN:  Soft, non-tender, non-distended.        EXTREMITIES:  On exam this is his right leg he has a palpable dorsalis pedis pulse his motion is a proximally 3-110 he has some joint line tenderness medially and laterally he is stable to varus and valgus stressing he is stable anterior-posterior drawer Divine testing causes pain    Ortho Exam      RADIOGRAPHIC FINDINGS:  July 14, 2023 standing x-rays right knee total 4 films including a sunrise there is significant joint space narrowing medially and laterally hypertrophy of the tibial eminence osteophytes no fractures appreciated.  Significant  DJD        MRI of right knee July 25, 2023 in areas it shows bone-on-bone lateral compartment of the knee the tricompartment DJD  .   Impression:     Absence anterior horn body lateral meniscus with surgical changes this likely is from prior meniscectomy.  Blunting of the posterior horn lateral meniscus suggest small tear.  Increased signal posterior horn medial meniscus extends to the periphery of the meniscus in could indicate tear and or degeneration.  Mild tricompartmental osteoarthrosis.        Electronically signed by: Rob Monahan  Date:                                            07/25/2023  Time:                                           14:34      IMPRESSION:  Right knee DJD gave him information on Voltaren gel he can rub into it on talked to him about total joints swing it is gives him enough trouble wants to have something done surgically that is the option    PLAN:  See him back p.r.n.  There are no Patient Instructions on file for this visit.      No follow-ups on file.         Gurvinder Hussein III      (Subject to voice recognition error, transcription service not allowed)

## 2023-12-21 ENCOUNTER — OFFICE VISIT (OUTPATIENT)
Dept: FAMILY MEDICINE | Facility: CLINIC | Age: 73
End: 2023-12-21
Payer: MEDICARE

## 2023-12-21 ENCOUNTER — PATIENT OUTREACH (OUTPATIENT)
Dept: ADMINISTRATIVE | Facility: HOSPITAL | Age: 73
End: 2023-12-21

## 2023-12-21 VITALS
WEIGHT: 222.38 LBS | HEIGHT: 71 IN | HEART RATE: 77 BPM | OXYGEN SATURATION: 96 % | TEMPERATURE: 98 F | RESPIRATION RATE: 18 BRPM | SYSTOLIC BLOOD PRESSURE: 111 MMHG | BODY MASS INDEX: 31.13 KG/M2 | DIASTOLIC BLOOD PRESSURE: 71 MMHG

## 2023-12-21 DIAGNOSIS — E78.5 HYPERLIPIDEMIA, UNSPECIFIED HYPERLIPIDEMIA TYPE: ICD-10-CM

## 2023-12-21 DIAGNOSIS — E11.9 TYPE 2 DIABETES MELLITUS WITHOUT COMPLICATION, WITHOUT LONG-TERM CURRENT USE OF INSULIN: ICD-10-CM

## 2023-12-21 DIAGNOSIS — I10 PRIMARY HYPERTENSION: Primary | ICD-10-CM

## 2023-12-21 LAB
ALBUMIN SERPL BCP-MCNC: 3.3 G/DL (ref 3.5–5)
ALBUMIN/GLOB SERPL: 0.7 {RATIO}
ALP SERPL-CCNC: 77 U/L (ref 45–115)
ALT SERPL W P-5'-P-CCNC: 21 U/L (ref 16–61)
ANION GAP SERPL CALCULATED.3IONS-SCNC: 8 MMOL/L (ref 7–16)
AST SERPL W P-5'-P-CCNC: 18 U/L (ref 15–37)
BASOPHILS # BLD AUTO: 0.06 K/UL (ref 0–0.2)
BASOPHILS NFR BLD AUTO: 1.3 % (ref 0–1)
BILIRUB SERPL-MCNC: 0.4 MG/DL (ref ?–1.2)
BUN SERPL-MCNC: 12 MG/DL (ref 7–18)
BUN/CREAT SERPL: 8 (ref 6–20)
CALCIUM SERPL-MCNC: 8.9 MG/DL (ref 8.5–10.1)
CHLORIDE SERPL-SCNC: 110 MMOL/L (ref 98–107)
CHOLEST SERPL-MCNC: 158 MG/DL (ref 0–200)
CHOLEST/HDLC SERPL: 3.8 {RATIO}
CO2 SERPL-SCNC: 27 MMOL/L (ref 21–32)
CREAT SERPL-MCNC: 1.49 MG/DL (ref 0.7–1.3)
CREAT UR-MCNC: 121 MG/DL (ref 39–259)
DIFFERENTIAL METHOD BLD: ABNORMAL
EGFR (NO RACE VARIABLE) (RUSH/TITUS): 49 ML/MIN/1.73M2
EOSINOPHIL # BLD AUTO: 0.18 K/UL (ref 0–0.5)
EOSINOPHIL NFR BLD AUTO: 3.9 % (ref 1–4)
ERYTHROCYTE [DISTWIDTH] IN BLOOD BY AUTOMATED COUNT: 15.6 % (ref 11.5–14.5)
EST. AVERAGE GLUCOSE BLD GHB EST-MCNC: 128 MG/DL
GLOBULIN SER-MCNC: 4.5 G/DL (ref 2–4)
GLUCOSE SERPL-MCNC: 113 MG/DL (ref 74–106)
HBA1C MFR BLD HPLC: 6.1 % (ref 4.5–6.6)
HCT VFR BLD AUTO: 40.1 % (ref 40–54)
HDLC SERPL-MCNC: 42 MG/DL (ref 40–60)
HGB BLD-MCNC: 12.3 G/DL (ref 13.5–18)
IMM GRANULOCYTES # BLD AUTO: 0.01 K/UL (ref 0–0.04)
IMM GRANULOCYTES NFR BLD: 0.2 % (ref 0–0.4)
LDLC SERPL CALC-MCNC: 99 MG/DL
LDLC/HDLC SERPL: 2.4 {RATIO}
LYMPHOCYTES # BLD AUTO: 2.4 K/UL (ref 1–4.8)
LYMPHOCYTES NFR BLD AUTO: 51.5 % (ref 27–41)
MCH RBC QN AUTO: 26.2 PG (ref 27–31)
MCHC RBC AUTO-ENTMCNC: 30.7 G/DL (ref 32–36)
MCV RBC AUTO: 85.5 FL (ref 80–96)
MICROALBUMIN UR-MCNC: <0.5 MG/DL (ref 0–2.8)
MICROALBUMIN/CREAT RATIO PNL UR: <4.1 MG/G (ref 0–30)
MONOCYTES # BLD AUTO: 0.61 K/UL (ref 0–0.8)
MONOCYTES NFR BLD AUTO: 13.1 % (ref 2–6)
MPC BLD CALC-MCNC: 10.9 FL (ref 9.4–12.4)
NEUTROPHILS # BLD AUTO: 1.4 K/UL (ref 1.8–7.7)
NEUTROPHILS NFR BLD AUTO: 30 % (ref 53–65)
NONHDLC SERPL-MCNC: 116 MG/DL
NRBC # BLD AUTO: 0 X10E3/UL
NRBC, AUTO (.00): 0 %
PLATELET # BLD AUTO: 192 K/UL (ref 150–400)
POTASSIUM SERPL-SCNC: 4.3 MMOL/L (ref 3.5–5.1)
PROT SERPL-MCNC: 7.8 G/DL (ref 6.4–8.2)
RBC # BLD AUTO: 4.69 M/UL (ref 4.6–6.2)
SODIUM SERPL-SCNC: 141 MMOL/L (ref 136–145)
TRIGL SERPL-MCNC: 85 MG/DL (ref 35–150)
VLDLC SERPL-MCNC: 17 MG/DL
WBC # BLD AUTO: 4.66 K/UL (ref 4.5–11)

## 2023-12-21 PROCEDURE — 85025 COMPLETE CBC W/AUTO DIFF WBC: CPT | Mod: ,,, | Performed by: CLINICAL MEDICAL LABORATORY

## 2023-12-21 PROCEDURE — 99214 OFFICE O/P EST MOD 30 MIN: CPT | Mod: ,,, | Performed by: STUDENT IN AN ORGANIZED HEALTH CARE EDUCATION/TRAINING PROGRAM

## 2023-12-21 PROCEDURE — 80053 COMPREHEN METABOLIC PANEL: CPT | Mod: ,,, | Performed by: CLINICAL MEDICAL LABORATORY

## 2023-12-21 PROCEDURE — 82043 UR ALBUMIN QUANTITATIVE: CPT | Mod: ,,, | Performed by: CLINICAL MEDICAL LABORATORY

## 2023-12-21 PROCEDURE — 80061 LIPID PANEL: CPT | Mod: ,,, | Performed by: CLINICAL MEDICAL LABORATORY

## 2023-12-21 PROCEDURE — 83036 HEMOGLOBIN GLYCOSYLATED A1C: CPT | Mod: ,,, | Performed by: CLINICAL MEDICAL LABORATORY

## 2023-12-21 PROCEDURE — 82570 ASSAY OF URINE CREATININE: CPT | Mod: ,,, | Performed by: CLINICAL MEDICAL LABORATORY

## 2023-12-21 RX ORDER — CLINDAMYCIN HYDROCHLORIDE 300 MG/1
300 CAPSULE ORAL 3 TIMES DAILY
COMMUNITY
Start: 2023-12-18 | End: 2023-12-21

## 2023-12-21 RX ORDER — RESPIRATORY SYNCYTIAL VISUS VACCINE RECOMBINANT, ADJUVANTED 120MCG/0.5
KIT INTRAMUSCULAR
COMMUNITY
Start: 2023-10-09

## 2023-12-21 RX ORDER — TETANUS TOXOID, REDUCED DIPHTHERIA TOXOID AND ACELLULAR PERTUSSIS VACCINE, ADSORBED 5; 2.5; 8; 8; 2.5 [IU]/.5ML; [IU]/.5ML; UG/.5ML; UG/.5ML; UG/.5ML
SUSPENSION INTRAMUSCULAR
COMMUNITY
Start: 2023-10-09

## 2023-12-21 NOTE — PROGRESS NOTES
Progress Note     ERIK PATTEN MD   [address]  [place] [address]     PATIENT NAME: Noemi Buckner  : 1950  DATE: 23  MRN: [id number]      Billing Provider: ERIK PATTEN MD  Level of Service:   Patient PCP Information       Provider PCP Type    ERIK PATTEN MD General                Medication Refill, Follow-up (Pt is fasting labs this morning/Pt requested for his prescription to be printed out on paper ), and Health Maintenance (Pt stated he had all the vaccines at [facility] in [place]/Pt will do a foot exam while in clinic today )      SUBJECTIVE:     Noemi Buckner is a 73 y.o.male who presents to clinic for Medication Refill, Follow-up (Pt is fasting labs this morning/Pt requested for his prescription to be printed out on paper ), and Health Maintenance (Pt stated he had all the vaccines at [facility] in [place]/Pt will do a foot exam while in clinic today )    Patient presents to clinic today for medication refills.  Patient states that he was doing well.  Patient did not bring his medications clinic today and notes that he was not sure what medicines he is actually taking.  He requests that we discuss his regimen with his wife.    Patient has a diagnosis of type 2 diabetes.  Patient has type 2 diabetes is currently diet controlled.  He does not take any medication for diabetes and does not check his blood sugar at home.    Patient also has a history of hypertension for which she has been prescribed metoprolol and amlodipine in the past.  Patient believes he is taking both of these medications.  Blood pressure is currently well-controlled.    Patient also has a history of gout for which he takes colchicine during flares.  Patient can not tolerate allopurinol.    Sees Vascular for PVD.     All other pertinent review of systems negative. Please see HPI for details.     Past Medical History:  has a past medical history of Arthritis, Gout (a), Hyperlipidemia, and  Hypertension.   Past Surgical History:  has a past surgical history that includes Circumcision; Shoulder surgery (Bilateral); and Knee arthroscopy (Right).  Family History: family history is not on file.  Social History:  reports that he has never smoked. He has been exposed to tobacco smoke. He has never used smokeless tobacco. He reports that he does not drink alcohol and does not use drugs.  Allergies:   Review of patient's allergies indicates:   Allergen Reactions    Allopurinol     Bactrim [sulfamethoxazole-trimethoprim]          Current Outpatient Medications:     amLODIPine (NORVASC) 2.5 MG tablet, Take 1 tablet (2.5 mg total) by mouth once daily., Disp: 90 tablet, Rfl: 1    AREXVY, PF, 120 mcg/0.5 mL SusR vaccine, , Disp: , Rfl:     BOOSTRIX TDAP 2.5-8-5 Lf-mcg-Lf/0.5mL Syrg injection, , Disp: , Rfl:     clindamycin (CLEOCIN) 300 MG capsule, Take 300 mg by mouth 3 (three) times daily., Disp: , Rfl:     clonazePAM (KLONOPIN) 0.5 MG tablet, , Disp: , Rfl:     colchicine (COLCRYS) 0.6 mg tablet, Take 1 tablet (0.6 mg total) by mouth once daily., Disp: 30 tablet, Rfl: 0    doxepin (SINEQUAN) 100 MG capsule, Take by mouth., Disp: , Rfl:     doxepin (SINEQUAN) 25 MG capsule, Take 2 capsules (50 mg total) by mouth nightly., Disp: 30 capsule, Rfl: 3    metoprolol succinate (TOPROL-XL) 100 MG 24 hr tablet, Take 1 tablet (100 mg total) by mouth once daily., Disp: 90 tablet, Rfl: 1    pravastatin (PRAVACHOL) 40 MG tablet, Take 1 tablet (40 mg total) by mouth nightly., Disp: 90 tablet, Rfl: 1    predniSONE (DELTASONE) 50 MG Tab, Take 1 tablet (50 mg total) by mouth once daily., Disp: 5 tablet, Rfl: 0    sildenafiL (VIAGRA) 100 MG tablet, Take  mg by mouth daily as needed., Disp: , Rfl:     tiZANidine (ZANAFLEX) 4 MG tablet, TAKE 1 TABLET BY MOUTH EVERY 6  HOURS AS NEEDED FOR MUSCLE SPASM, Disp: 90 tablet, Rfl: 0    triamcinolone acetonide 0.025% (KENALOG) 0.025 % cream, Apply topically 2 (two) times daily., Disp:  "80 g, Rfl: 1   OBJECTIVE:     Vital Signs   /71 (BP Location: Left arm, Patient Position: Sitting, BP Method: Large (Manual))   Pulse 77   Temp 98 °F (36.7 °C) (Temporal)   Resp 18   Ht 5' 11" (1.803 m)   Wt 100.9 kg (222 lb 6.4 oz)   SpO2 96%   BMI 31.02 kg/m²     Physical Exam  Constitutional:       General: He is not in acute distress.     Appearance: Normal appearance. He is not ill-appearing.   HENT:      Head: Normocephalic and atraumatic.   Eyes:      Extraocular Movements: Extraocular movements intact.      Pupils: Pupils are equal, round, and reactive to light.   Cardiovascular:      Rate and Rhythm: Normal rate and regular rhythm.      Pulses:           Dorsalis pedis pulses are 1+ on the right side and 1+ on the left side.        Posterior tibial pulses are 1+ on the right side and 1+ on the left side.      Heart sounds: No murmur heard.     No friction rub. No gallop.   Pulmonary:      Effort: Pulmonary effort is normal. No respiratory distress.      Breath sounds: Normal breath sounds. No wheezing, rhonchi or rales.   Abdominal:      Palpations: Abdomen is soft.      Tenderness: There is no abdominal tenderness. There is no guarding or rebound.   Musculoskeletal:         General: Normal range of motion.   Feet:      Right foot:      Protective Sensation: 10 sites tested.  10 sites sensed.      Skin integrity: Dry skin present.      Toenail Condition: Right toenails are abnormally thick.      Left foot:      Protective Sensation: 10 sites tested.  10 sites sensed.      Skin integrity: Dry skin present.      Toenail Condition: Left toenails are abnormally thick.   Skin:     General: Skin is warm and dry.      Capillary Refill: Capillary refill takes less than 2 seconds.   Neurological:      General: No focal deficit present.      Mental Status: He is alert.   Psychiatric:         Mood and Affect: Mood normal.         Behavior: Behavior normal.         ASSESSMENT/PLAN:     1. Primary " hypertension  -     Lipid Panel; Future; Expected date: 12/21/2023  -     Comprehensive Metabolic Panel; Future; Expected date: 12/21/2023  -     CBC Auto Differential; Future; Expected date: 12/21/2023  Continue amlodipine and metoprolol.  Nursing staff to contact patient's wife to get list of complete medications patient is taking.  We will provide prescriptions at that time.  We will obtain routine blood work.    2. Hyperlipidemia, unspecified hyperlipidemia type  -     Lipid Panel; Future; Expected date: 12/21/2023  We will obtain lipid panel.  Patient is prescribed pravastatin.  Patient is unsure if he was taking this medication.  We will discuss with the patient's wife.  We will provide refill when needed.    3. Type 2 diabetes mellitus without complication, without long-term current use of insulin  -     Microalbumin/Creatinine Ratio, Urine  -     Hemoglobin A1C; Future; Expected date: 12/21/2023  -     Lipid Panel; Future; Expected date: 12/21/2023  -     Comprehensive Metabolic Panel; Future; Expected date: 12/21/2023  Patient has a history of type 2 diabetes is currently diet controlled.  We will obtain routine blood work.      Follow up in about 6 months (around 6/21/2024) for Recheck .      ERIK PATTEN MD  12/21/2023    Due to voice recognition software, sound alike and misspelled words may be contained in the documentation.

## 2023-12-22 NOTE — PROGRESS NOTES
Please let patient know that his electrolytes and liver enzymes are normal. His creatinine is elevated. He will need to drink more water. We will need to recheck this level in 3 months. He will need to avoid NSAIDs. His blood count is normal. His hemoglobin A1c is well-controlled. His cholesterol is well controlled as well.

## 2024-01-09 DIAGNOSIS — M1A.09X0 IDIOPATHIC CHRONIC GOUT OF MULTIPLE SITES WITHOUT TOPHUS: ICD-10-CM

## 2024-01-09 RX ORDER — COLCHICINE 0.6 MG/1
0.6 TABLET ORAL DAILY
Qty: 90 TABLET | Refills: 3 | Status: SHIPPED | OUTPATIENT
Start: 2024-01-09 | End: 2025-01-08

## 2024-02-12 LAB
LEFT EYE DM RETINOPATHY: NEGATIVE
RIGHT EYE DM RETINOPATHY: NEGATIVE

## 2024-03-09 DIAGNOSIS — Z71.89 COMPLEX CARE COORDINATION: ICD-10-CM

## 2024-05-16 ENCOUNTER — OFFICE VISIT (OUTPATIENT)
Dept: FAMILY MEDICINE | Facility: CLINIC | Age: 74
End: 2024-05-16
Payer: MEDICARE

## 2024-05-16 VITALS
HEIGHT: 71 IN | WEIGHT: 213 LBS | RESPIRATION RATE: 18 BRPM | BODY MASS INDEX: 29.82 KG/M2 | HEART RATE: 86 BPM | OXYGEN SATURATION: 96 % | DIASTOLIC BLOOD PRESSURE: 86 MMHG | TEMPERATURE: 98 F | SYSTOLIC BLOOD PRESSURE: 126 MMHG

## 2024-05-16 DIAGNOSIS — R05.1 ACUTE COUGH: ICD-10-CM

## 2024-05-16 DIAGNOSIS — R09.81 HEAD CONGESTION: ICD-10-CM

## 2024-05-16 DIAGNOSIS — J06.9 UPPER RESPIRATORY TRACT INFECTION, UNSPECIFIED TYPE: Primary | ICD-10-CM

## 2024-05-16 DIAGNOSIS — J02.9 SORE THROAT: ICD-10-CM

## 2024-05-16 PROBLEM — R21 RASH AND NONSPECIFIC SKIN ERUPTION: Status: RESOLVED | Noted: 2022-07-28 | Resolved: 2024-05-16

## 2024-05-16 LAB
CTP QC/QA: YES
MOLECULAR STREP A: NEGATIVE
POC MOLECULAR INFLUENZA A AGN: NEGATIVE
POC MOLECULAR INFLUENZA B AGN: NEGATIVE
SARS-COV-2 RDRP RESP QL NAA+PROBE: NEGATIVE

## 2024-05-16 PROCEDURE — 87502 INFLUENZA DNA AMP PROBE: CPT | Mod: RHCUB | Performed by: NURSE PRACTITIONER

## 2024-05-16 PROCEDURE — 96372 THER/PROPH/DIAG INJ SC/IM: CPT | Mod: ,,, | Performed by: NURSE PRACTITIONER

## 2024-05-16 PROCEDURE — 99213 OFFICE O/P EST LOW 20 MIN: CPT | Mod: ,,, | Performed by: NURSE PRACTITIONER

## 2024-05-16 PROCEDURE — 87651 STREP A DNA AMP PROBE: CPT | Mod: RHCUB | Performed by: NURSE PRACTITIONER

## 2024-05-16 PROCEDURE — 87635 SARS-COV-2 COVID-19 AMP PRB: CPT | Mod: RHCUB | Performed by: NURSE PRACTITIONER

## 2024-05-16 RX ORDER — CEFTRIAXONE 1 G/1
1 INJECTION, POWDER, FOR SOLUTION INTRAMUSCULAR; INTRAVENOUS
Status: COMPLETED | OUTPATIENT
Start: 2024-05-16 | End: 2024-05-16

## 2024-05-16 RX ORDER — ATORVASTATIN CALCIUM 40 MG/1
TABLET, FILM COATED ORAL
COMMUNITY
Start: 2024-03-11

## 2024-05-16 RX ORDER — AZITHROMYCIN 250 MG/1
TABLET, FILM COATED ORAL
Qty: 6 TABLET | Refills: 0 | Status: SHIPPED | OUTPATIENT
Start: 2024-05-16 | End: 2024-05-21

## 2024-05-16 RX ORDER — LATANOPROST 50 UG/ML
1 SOLUTION/ DROPS OPHTHALMIC NIGHTLY
COMMUNITY
Start: 2024-05-02

## 2024-05-16 RX ORDER — DEXAMETHASONE SODIUM PHOSPHATE 4 MG/ML
4 INJECTION, SOLUTION INTRA-ARTICULAR; INTRALESIONAL; INTRAMUSCULAR; INTRAVENOUS; SOFT TISSUE
Status: COMPLETED | OUTPATIENT
Start: 2024-05-16 | End: 2024-05-16

## 2024-05-16 RX ORDER — PROMETHAZINE HYDROCHLORIDE AND DEXTROMETHORPHAN HYDROBROMIDE 6.25; 15 MG/5ML; MG/5ML
5 SYRUP ORAL EVERY 8 HOURS PRN
Qty: 118 ML | Refills: 0 | Status: SHIPPED | OUTPATIENT
Start: 2024-05-16 | End: 2024-05-26

## 2024-05-16 RX ADMIN — DEXAMETHASONE SODIUM PHOSPHATE 4 MG: 4 INJECTION, SOLUTION INTRA-ARTICULAR; INTRALESIONAL; INTRAMUSCULAR; INTRAVENOUS; SOFT TISSUE at 09:05

## 2024-05-16 RX ADMIN — CEFTRIAXONE 1 G: 1 INJECTION, POWDER, FOR SOLUTION INTRAMUSCULAR; INTRAVENOUS at 09:05

## 2024-05-16 NOTE — Clinical Note
States he's seen dr clarke in [place] for his eyes in the past yr.
Patient/Caregiver requests family/friend to interpret.

## 2024-05-16 NOTE — ASSESSMENT & PLAN NOTE
"Head congestion, hoarse, cough  Negative covid/flu  Requests "shot" today  Discussed risks/benefits/alternatives for treatment; patient voices understanding  Last A1C @ 6.1  Rocephin, decadron IM, zithromax po  Rest. Increase fluids as tolerated   "

## 2024-05-16 NOTE — PROGRESS NOTES
Health Maintenance Due   Topic Date Due    Hepatitis C Screening  Never done    Pneumococcal Vaccines (Age 65+) (1 of 2 - PCV) Never done    COVID-19 Vaccine (6 - 2023-24 season) 02/12/2024    Eye Exam  08/02/2024     Discussed care gaps.  Not interested in vaccs today.  States he's seen Dr Jeffery for his eye exam a few months ago. Requested record.

## 2024-05-16 NOTE — PROGRESS NOTES
ARON Caldera   [facility]  [address] [address]  [phone]      PATIENT NAME: Noemi Buckner  : 1950  DATE: 24  MRN: [id number]      Billing Provider: ARON Caldera  Level of Service:   Patient PCP Information       Provider PCP Type    ERIK PATTEN MD General            Reason for Visit / Chief Complaint: Nasal Congestion, Cough, Headache, and Sore Throat (Symptoms X4 days.)       Update PCP  Update Chief Complaint         History of Present Illness / Problem Focused Workflow     73 year old male presents with complaints of head congestion, cough, HA  Symptoms started about 4 days ago      Review of Systems     Review of Systems   Constitutional:  Positive for fatigue. Negative for fever.   HENT:  Positive for congestion, sinus pressure and sore throat. Negative for ear pain.    Respiratory:  Positive for cough. Negative for shortness of breath and wheezing.    Cardiovascular:  Negative for chest pain.   Gastrointestinal:  Negative for abdominal pain, constipation, diarrhea and vomiting.   Endocrine: Negative for polydipsia and polyuria.   Musculoskeletal:  Positive for arthralgias. Negative for gait problem.   Allergic/Immunologic: Negative for environmental allergies.   Neurological:  Negative for dizziness, weakness and headaches.   Psychiatric/Behavioral:  Negative for dysphoric mood. The patient is not nervous/anxious.        Medical / Social / Family History     Past Medical History:   Diagnosis Date    Anxiety     Arthritis     Gout a    Hyperlipidemia     Hypertension     Insomnia     Male erectile dysfunction, unspecified        Past Surgical History:   Procedure Laterality Date    CIRCUMCISION      KNEE ARTHROSCOPY Right     SHOULDER SURGERY Bilateral        Social History    reports that he has never smoked. He has been exposed to tobacco smoke. He has never used smokeless tobacco. He reports that he  does not drink alcohol and does not use drugs.    Family History  's family history is not on file.    Medications and Allergies     Medications  Outpatient Medications Marked as Taking for the 5/16/24 encounter (Office Visit) with Sherry Almazan FNP   Medication Sig Dispense Refill    amLODIPine (NORVASC) 2.5 MG tablet Take 1 tablet (2.5 mg total) by mouth once daily. 90 tablet 1    atorvastatin (LIPITOR) 40 MG tablet       clonazePAM (KLONOPIN) 0.5 MG tablet       colchicine (COLCRYS) 0.6 mg tablet Take 1 tablet (0.6 mg total) by mouth once daily. 90 tablet 3    doxepin (SINEQUAN) 25 MG capsule Take 2 capsules (50 mg total) by mouth nightly. 30 capsule 3    latanoprost 0.005 % ophthalmic solution Place 1 drop into the right eye every evening.      metoprolol succinate (TOPROL-XL) 100 MG 24 hr tablet Take 1 tablet (100 mg total) by mouth once daily. 90 tablet 1    sildenafiL (VIAGRA) 100 MG tablet Take  mg by mouth daily as needed.      tiZANidine (ZANAFLEX) 4 MG tablet TAKE 1 TABLET BY MOUTH EVERY 6  HOURS AS NEEDED FOR MUSCLE SPASM 90 tablet 0    triamcinolone acetonide 0.025% (KENALOG) 0.025 % cream Apply topically 2 (two) times daily. 80 g 1     Current Facility-Administered Medications for the 5/16/24 encounter (Office Visit) with Sherry Almazan FNP   Medication Dose Route Frequency Provider Last Rate Last Admin    [COMPLETED] cefTRIAXone injection 1 g  1 g Intramuscular 1 time in Clinic/HOD Sherry Almazan FNP   1 g at 05/16/24 0957    [COMPLETED] dexAMETHasone injection 4 mg  4 mg Intramuscular 1 time in Clinic/HOD Sherry Almazan FNP   4 mg at 05/16/24 0958       Allergies  Review of patient's allergies indicates:   Allergen Reactions    Allopurinol     Bactrim [sulfamethoxazole-trimethoprim]        Physical Examination     Vitals:    05/16/24 0838   BP: 126/86   Pulse: 86   Resp: 18   Temp: 97.7 °F (36.5 °C)     Physical Exam  Constitutional:       General: He is not in acute distress.  HENT:       Right Ear: Tympanic membrane normal.      Left Ear: Tympanic membrane normal.      Nose: Congestion present.      Mouth/Throat:      Mouth: Mucous membranes are moist.      Pharynx: Posterior oropharyngeal erythema present. No oropharyngeal exudate.   Eyes:      Extraocular Movements: Extraocular movements intact.   Cardiovascular:      Rate and Rhythm: Normal rate.   Pulmonary:      Effort: Pulmonary effort is normal. No respiratory distress.      Breath sounds: No wheezing.   Abdominal:      General: Bowel sounds are normal.      Palpations: Abdomen is soft.   Musculoskeletal:         General: Normal range of motion.      Cervical back: Normal range of motion.   Skin:     General: Skin is warm.   Neurological:      Mental Status: He is alert.   Psychiatric:         Behavior: Behavior normal.           Imaging / Labs     Office Visit on 05/16/2024   Component Date Value Ref Range Status    POC Rapid COVID 05/16/2024 Negative  Negative Final     Acceptable 05/16/2024 Yes   Final    POC Molecular Influenza A Ag 05/16/2024 Negative  Negative Final    POC Molecular Influenza B Ag 05/16/2024 Negative  Negative Final     Acceptable 05/16/2024 Yes   Final    Molecular Strep A, POC 05/16/2024 Negative  Negative Final     Acceptable 05/16/2024 Yes   Final     MRI Knee Without Contrast Right  Narrative: EXAMINATION:  MRI KNEE WITHOUT CONTRAST RIGHT    CLINICAL HISTORY:  Knee trauma, internal derangement suspected, xray done; Pain in right knee    TECHNIQUE:  Axial sagittal and coronal imaging of the knee is performed using T1, proton density, proton density fat-sat, STIR and gradient sequences.    COMPARISON:  12 November 2018    FINDINGS:  The anterior and posterior cruciate ligaments are intact without evidence of tear.    Scattered areas of artifact present around the joint and within the joint consistent with previous surgery.  There is tear of the posterior horn medial  "meniscus poorly defined but approaches the periphery of the meniscus.  There is absence of anterior horn and body of the lateral meniscus.  There is some blunting of the posterior horn lateral meniscus.    There is thinning of articular cartilage and small osteophytes in all compartments.    The medial and lateral collateral ligament complexes are intact without evidence of abnormality.    The extensor mechanism is intact and appears within normal limits.    No abnormal osseous of marrow signal or edema is seen. No focal cartilage defect is present.  Impression: Absence anterior horn body lateral meniscus with surgical changes this likely is from prior meniscectomy.  Blunting of the posterior horn lateral meniscus suggest small tear.  Increased signal posterior horn medial meniscus extends to the periphery of the meniscus in could indicate tear and or degeneration.  Mild tricompartmental osteoarthrosis.    Electronically signed by: Rob Monahan  Date:    07/25/2023  Time:    14:34      Assessment and Plan (including Health Maintenance)      Problem List  Smart Sets  Document Outside HM   :    Health Maintenance Due   Topic Date Due    Hepatitis C Screening  Never done    Pneumococcal Vaccines (Age 65+) (1 of 2 - PCV) Never done    COVID-19 Vaccine (6 - 2023-24 season) 02/12/2024    Eye Exam  08/02/2024       Problem List Items Addressed This Visit          ENT    Upper respiratory tract infection - Primary    Current Assessment & Plan     Head congestion, hoarse, cough  Negative covid/flu  Requests "shot" today  Discussed risks/benefits/alternatives for treatment; patient voices understanding  Last A1C @ 6.1  Rocephin, decadron IM, zithromax po  Rest. Increase fluids as tolerated          Relevant Medications    dexAMETHasone injection 4 mg (Completed)    cefTRIAXone injection 1 g (Completed)    azithromycin (Z-JALIL) 250 MG tablet     Other Visit Diagnoses       Sore throat        Relevant Orders    POCT Strep A, " Molecular (Completed)    Acute cough        Relevant Medications    dexAMETHasone injection 4 mg (Completed)    promethazine-dextromethorphan (PROMETHAZINE-DM) 6.25-15 mg/5 mL Syrp    Other Relevant Orders    POCT COVID-19 Rapid Screening (Completed)    POCT Influenza A/B Molecular (Completed)    Head congestion        Relevant Medications    dexAMETHasone injection 4 mg (Completed)            Health Maintenance Topics with due status: Not Due       Topic Last Completion Date    Colorectal Cancer Screening 01/06/2023    TETANUS VACCINE 10/09/2023    Diabetes Urine Screening 12/21/2023    Foot Exam 12/21/2023    Lipid Panel 12/21/2023    Hemoglobin A1c 12/21/2023    Low Dose Statin 05/16/2024       Future Appointments   Date Time Provider Department Center   7/31/2024  8:00 AM April Hansen MD [facility] [place]          Signature:  ARON [facility] [address] [place] [address]  [phone]    Date of encounter: 5/16/24

## 2024-05-17 ENCOUNTER — PATIENT OUTREACH (OUTPATIENT)
Dept: ADMINISTRATIVE | Facility: HOSPITAL | Age: 74
End: 2024-05-17

## 2024-05-17 NOTE — LETTER
AUTHORIZATION FOR RELEASE OF   CONFIDENTIAL INFORMATION    Dear [facility],    We are seeing Noemi Buckner, date of birth 1950, in the clinic at [facility]. April Hansen MD is the patient's PCP. Noemi Buckner has an outstanding lab/procedure at the time we reviewed his chart. In order to help keep his health information updated, he has authorized us to request the following medical record(s):        (  )  MAMMOGRAM                                      (  )  COLONOSCOPY      (  )  PAP SMEAR                                          (  )  OUTSIDE LAB RESULTS     (  )  DEXA SCAN                                          (X)  EYE EXAM            (  )  FOOT EXAM                                          (  )  ENTIRE RECORD     (  )  OUTSIDE IMMUNIZATIONS                 (  )  _______________         Please fax records to [facility], Glenda Perez, [phone].     If you have any questions, please contact [phone].          Patient Name: Noemi Buckner  : 1950  Patient Phone #: [phone]

## 2024-05-29 ENCOUNTER — PATIENT OUTREACH (OUTPATIENT)
Facility: HOSPITAL | Age: 74
End: 2024-05-29
Payer: MEDICARE

## 2024-07-31 ENCOUNTER — OFFICE VISIT (OUTPATIENT)
Dept: FAMILY MEDICINE | Facility: CLINIC | Age: 74
End: 2024-07-31
Payer: MEDICARE

## 2024-07-31 VITALS
OXYGEN SATURATION: 99 % | HEART RATE: 79 BPM | TEMPERATURE: 99 F | RESPIRATION RATE: 19 BRPM | SYSTOLIC BLOOD PRESSURE: 125 MMHG | HEIGHT: 71 IN | WEIGHT: 212.19 LBS | BODY MASS INDEX: 29.71 KG/M2 | DIASTOLIC BLOOD PRESSURE: 77 MMHG

## 2024-07-31 DIAGNOSIS — U07.1 COVID-19: ICD-10-CM

## 2024-07-31 DIAGNOSIS — U07.1 COVID-19 VIRUS DETECTED: ICD-10-CM

## 2024-07-31 DIAGNOSIS — I10 PRIMARY HYPERTENSION: ICD-10-CM

## 2024-07-31 DIAGNOSIS — M62.838 MUSCLE SPASM: ICD-10-CM

## 2024-07-31 DIAGNOSIS — R05.1 ACUTE COUGH: ICD-10-CM

## 2024-07-31 DIAGNOSIS — E11.9 TYPE 2 DIABETES MELLITUS WITHOUT COMPLICATION, WITHOUT LONG-TERM CURRENT USE OF INSULIN: Primary | ICD-10-CM

## 2024-07-31 DIAGNOSIS — R21 RASH AND NONSPECIFIC SKIN ERUPTION: ICD-10-CM

## 2024-07-31 PROBLEM — J06.9 UPPER RESPIRATORY TRACT INFECTION: Status: RESOLVED | Noted: 2024-05-16 | Resolved: 2024-07-31

## 2024-07-31 LAB
CTP QC/QA: YES
CTP QC/QA: YES
POC MOLECULAR INFLUENZA A AGN: NEGATIVE
POC MOLECULAR INFLUENZA B AGN: NEGATIVE
SARS-COV-2 RDRP RESP QL NAA+PROBE: POSITIVE

## 2024-07-31 PROCEDURE — 87502 INFLUENZA DNA AMP PROBE: CPT | Mod: RHCUB | Performed by: STUDENT IN AN ORGANIZED HEALTH CARE EDUCATION/TRAINING PROGRAM

## 2024-07-31 PROCEDURE — 99214 OFFICE O/P EST MOD 30 MIN: CPT | Mod: ,,, | Performed by: STUDENT IN AN ORGANIZED HEALTH CARE EDUCATION/TRAINING PROGRAM

## 2024-07-31 PROCEDURE — 87635 SARS-COV-2 COVID-19 AMP PRB: CPT | Mod: RHCUB | Performed by: STUDENT IN AN ORGANIZED HEALTH CARE EDUCATION/TRAINING PROGRAM

## 2024-07-31 RX ORDER — TRIAMCINOLONE ACETONIDE 0.25 MG/G
CREAM TOPICAL 2 TIMES DAILY
Qty: 80 G | Refills: 1 | Status: SHIPPED | OUTPATIENT
Start: 2024-07-31 | End: 2024-07-31

## 2024-07-31 RX ORDER — BENZONATATE 100 MG/1
100 CAPSULE ORAL 3 TIMES DAILY PRN
Qty: 30 CAPSULE | Refills: 0 | Status: SHIPPED | OUTPATIENT
Start: 2024-07-31 | End: 2024-08-10

## 2024-07-31 RX ORDER — FLUTICASONE PROPIONATE 50 MCG
1 SPRAY, SUSPENSION (ML) NASAL DAILY
Qty: 15.8 ML | Refills: 0 | Status: SHIPPED | OUTPATIENT
Start: 2024-07-31

## 2024-07-31 RX ORDER — CETIRIZINE HYDROCHLORIDE 10 MG/1
10 TABLET ORAL DAILY
Qty: 30 TABLET | Refills: 0 | Status: SHIPPED | OUTPATIENT
Start: 2024-07-31 | End: 2025-07-31

## 2024-07-31 RX ORDER — TRIAMCINOLONE ACETONIDE 0.25 MG/G
CREAM TOPICAL 2 TIMES DAILY
Qty: 80 G | Refills: 1 | Status: SHIPPED | OUTPATIENT
Start: 2024-07-31

## 2024-07-31 RX ORDER — TIZANIDINE 4 MG/1
4 TABLET ORAL DAILY PRN
Qty: 90 TABLET | Refills: 0 | Status: SHIPPED | OUTPATIENT
Start: 2024-07-31

## 2024-07-31 NOTE — LETTER
July 31, 2024      [facility] [place]  Family [address] [place] [address]  Phone: [phone]  Fax: [phone]       Patient: Noemi Buckner   YOB: 1950  Date of Visit: 07/31/2024    To Whom It May Concern:    Julissa Buckner  was at [facility] on 07/31/2024. The patient may return to work/school on 07/02/224 with no restrictions. If you have any questions or concerns, or if I can be of further assistance, please do not hesitate to contact me.    Sincerely,    Teo Coto

## 2024-07-31 NOTE — PROGRESS NOTES
Progress Note     ERIK PATTEN MD   [address]  [place] [address]     PATIENT NAME: Noemi Buckner  : 1950  DATE: 24  MRN: [id number]      Billing Provider: ERIK PATTEN MD  Level of Service: OR OFFICE/OUTPT VISIT, EST, LEVL IV, 30-39 MIN  Patient PCP Information       Provider PCP Type    ERIK PATTEN MD General                Medication Refill, Follow-up, Health Maintenance (Discussed care gaps w/pt/Pt not interested in any vaccines today/Pt wants to have A1c drawn while in clinic today ), Hypertension (6mth f/u hypertension /Pt is fasting labs ), and Cough (Pt is being swab for covid/flu/Pt been had a cough for x3days )      SUBJECTIVE:     Noemi Buckner is a 74 y.o.male who presents to clinic for Medication Refill, Follow-up, Health Maintenance (Discussed care gaps w/pt/Pt not interested in any vaccines today/Pt wants to have A1c drawn while in clinic today ), Hypertension (6mth f/u hypertension /Pt is fasting labs ), and Cough (Pt is being swab for covid/flu/Pt been had a cough for x3days )    Patient presents to clinic today for six-month follow up for URI symptoms.    Patient has a history of diet-controlled diabetes.  Last hemoglobin A1c was well-controlled.    Patient also has a history of hypertension for which he takes metoprolol and amlodipine.  Blood pressure is currently well-controlled.    He also has a history of hyperlipidemia for which he takes Lipitor.  Patient up-to-date on lipid panel.    Patient also having URI symptoms for the past 3 days.  Patient notes sore throat, cough, and body aches.  He has been afebrile.  He was tolerating p.o..  Denies shortness of breath.  He denies sputum production.  Patient has been taking NyQuil with relief.     Seeing Vascular and Cardiology and Urology.     He is followed by Urology for elevated PSA and erectile dysfunction.  He takes Viagra as needed.      He was followed by Dr. Arteaga with  Cardiology for peripheral vascular disease, hypertension, and PVCs.    All other pertinent review of systems negative. Please see HPI for details.     Past Medical History:  has a past medical history of Anxiety, Arthritis, Gout (a), Hyperlipidemia, Hypertension, Insomnia, and Male erectile dysfunction, unspecified.   Past Surgical History:  has a past surgical history that includes Circumcision; Shoulder surgery (Bilateral); and Knee arthroscopy (Right).  Family History: family history is not on file.  Social History:  reports that he has never smoked. He has been exposed to tobacco smoke. He has never used smokeless tobacco. He reports that he does not drink alcohol and does not use drugs.  Allergies:   Review of patient's allergies indicates:   Allergen Reactions    Allopurinol     Bactrim [sulfamethoxazole-trimethoprim]          Current Outpatient Medications:     amLODIPine (NORVASC) 2.5 MG tablet, Take 1 tablet (2.5 mg total) by mouth once daily., Disp: 90 tablet, Rfl: 1    atorvastatin (LIPITOR) 40 MG tablet, , Disp: , Rfl:     clonazePAM (KLONOPIN) 0.5 MG tablet, , Disp: , Rfl:     colchicine (COLCRYS) 0.6 mg tablet, Take 1 tablet (0.6 mg total) by mouth once daily., Disp: 90 tablet, Rfl: 3    doxepin (SINEQUAN) 25 MG capsule, Take 2 capsules (50 mg total) by mouth nightly., Disp: 30 capsule, Rfl: 3    latanoprost 0.005 % ophthalmic solution, Place 1 drop into the right eye every evening., Disp: , Rfl:     metoprolol succinate (TOPROL-XL) 100 MG 24 hr tablet, Take 1 tablet (100 mg total) by mouth once daily., Disp: 90 tablet, Rfl: 1    sildenafiL (VIAGRA) 100 MG tablet, Take  mg by mouth daily as needed., Disp: , Rfl:     benzonatate (TESSALON) 100 MG capsule, Take 1 capsule (100 mg total) by mouth 3 (three) times daily as needed for Cough., Disp: 30 capsule, Rfl: 0    cetirizine (ZYRTEC) 10 MG tablet, Take 1 tablet (10 mg total) by mouth once daily., Disp: 30 tablet, Rfl: 0    fluticasone propionate  "(FLONASE) 50 mcg/actuation nasal spray, 1 spray (50 mcg total) by Each Nostril route once daily., Disp: 15.8 mL, Rfl: 0    nirmatrelvir-ritonavir 150-100 mg DsPk, Take 2 tablets twice daily., Disp: 20 tablet, Rfl: 0    tiZANidine (ZANAFLEX) 4 MG tablet, Take 1 tablet (4 mg total) by mouth daily as needed (Muscle spasm)., Disp: 90 tablet, Rfl: 0    triamcinolone acetonide 0.025% (KENALOG) 0.025 % cream, Apply topically 2 (two) times daily., Disp: 80 g, Rfl: 1   OBJECTIVE:     Vital Signs   /77 (BP Location: Right arm, Patient Position: Sitting, BP Method: Large (Manual))   Pulse 79   Temp 99.2 °F (37.3 °C) (Oral)   Resp 19   Ht 5' 11" (1.803 m)   Wt 96.3 kg (212 lb 3.2 oz)   SpO2 99%   BMI 29.60 kg/m²     Physical Exam  Constitutional:       General: He is not in acute distress.     Appearance: Normal appearance. He is not ill-appearing.   HENT:      Head: Normocephalic and atraumatic.      Right Ear: Tympanic membrane normal.      Left Ear: Tympanic membrane normal.      Nose: Congestion present. No rhinorrhea.      Mouth/Throat:      Pharynx: No oropharyngeal exudate or posterior oropharyngeal erythema.   Eyes:      Extraocular Movements: Extraocular movements intact.      Pupils: Pupils are equal, round, and reactive to light.   Cardiovascular:      Rate and Rhythm: Normal rate and regular rhythm.      Heart sounds: No murmur heard.     No friction rub. No gallop.   Pulmonary:      Effort: Pulmonary effort is normal. No respiratory distress.      Breath sounds: Normal breath sounds. No wheezing, rhonchi or rales.   Abdominal:      Palpations: Abdomen is soft.      Tenderness: There is no abdominal tenderness. There is no guarding or rebound.   Musculoskeletal:         General: Normal range of motion.   Skin:     General: Skin is warm and dry.      Capillary Refill: Capillary refill takes less than 2 seconds.   Neurological:      General: No focal deficit present.      Mental Status: He is alert. "   Psychiatric:         Mood and Affect: Mood normal.         Behavior: Behavior normal.         ASSESSMENT/PLAN:     1. Type 2 diabetes mellitus without complication, without long-term current use of insulin  -     Hemoglobin A1C; Future; Expected date: 07/31/2024  -     CBC Auto Differential; Future; Expected date: 07/31/2024  -     Basic Metabolic Panel; Future; Expected date: 07/31/2024  Currently diet-controlled.  Obtaining routine blood work.    2. Primary hypertension  -     CBC Auto Differential; Future; Expected date: 07/31/2024  -     Basic Metabolic Panel; Future; Expected date: 07/31/2024  Well-controlled with metoprolol and amlodipine.  Patient to continue.  Obtaining routine blood work for monitoring.    3. Rash and nonspecific skin eruption  -     Discontinue: triamcinolone acetonide 0.025% (KENALOG) 0.025 % cream; Apply topically 2 (two) times daily.  Dispense: 80 g; Refill: 1  -     triamcinolone acetonide 0.025% (KENALOG) 0.025 % cream; Apply topically 2 (two) times daily.  Dispense: 80 g; Refill: 1  Refill provided per patient request.    4. Muscle spasm  -     tiZANidine (ZANAFLEX) 4 MG tablet; Take 1 tablet (4 mg total) by mouth daily as needed (Muscle spasm).  Dispense: 90 tablet; Refill: 0  Refill provided per patient request.    5. COVID-19  -     nirmatrelvir-ritonavir 150-100 mg DsPk; Take 2 tablets twice daily.  Dispense: 20 tablet; Refill: 0  -     benzonatate (TESSALON) 100 MG capsule; Take 1 capsule (100 mg total) by mouth 3 (three) times daily as needed for Cough.  Dispense: 30 capsule; Refill: 0  -     cetirizine (ZYRTEC) 10 MG tablet; Take 1 tablet (10 mg total) by mouth once daily.  Dispense: 30 tablet; Refill: 0  -     fluticasone propionate (FLONASE) 50 mcg/actuation nasal spray; 1 spray (50 mcg total) by Each Nostril route once daily.  Dispense: 15.8 mL; Refill: 0    6. Acute cough  -     POCT COVID-19 Rapid Screening  -     POCT Influenza A/B Molecular  Patient with URI symptoms.   Patient without signs of superimposed bacterial infection at this time.  Patient tested for flu and COVID and was found to be positive for COVID-19.  Counseled on risks and benefits of Paxlovid.  Patient would like Paxlovid.  Prescribing medication.  We will renally dose.  Patient to hold Viagra and Lipitor while taking this medication.  Also provided medication for symptomatic relief.  Discussed isolation precautions.  Emergency precautions discussed.  Patient verbalized understanding.  Patient to follow up if symptoms worsen or fail to improve.      Follow up in about 6 months (around 1/31/2025) for FU DMII.      ERIK PATTEN MD  07/31/2024    Due to voice recognition software, sound alike and misspelled words may be contained in the documentation.

## 2024-08-22 DIAGNOSIS — D64.9 ANEMIA, UNSPECIFIED TYPE: Primary | ICD-10-CM

## 2024-08-23 DIAGNOSIS — D64.9 ANEMIA, UNSPECIFIED TYPE: Primary | ICD-10-CM

## 2024-10-09 DIAGNOSIS — Z71.89 COMPLEX CARE COORDINATION: ICD-10-CM

## 2024-11-16 DIAGNOSIS — M1A.09X0 IDIOPATHIC CHRONIC GOUT OF MULTIPLE SITES WITHOUT TOPHUS: ICD-10-CM

## 2024-11-18 RX ORDER — COLCHICINE 0.6 MG/1
0.6 TABLET ORAL
Qty: 90 TABLET | Refills: 0 | Status: SHIPPED | OUTPATIENT
Start: 2024-11-18

## 2025-01-28 ENCOUNTER — OFFICE VISIT (OUTPATIENT)
Dept: FAMILY MEDICINE | Facility: CLINIC | Age: 75
End: 2025-01-28
Payer: MEDICARE

## 2025-01-28 VITALS
SYSTOLIC BLOOD PRESSURE: 120 MMHG | RESPIRATION RATE: 18 BRPM | TEMPERATURE: 98 F | DIASTOLIC BLOOD PRESSURE: 78 MMHG | HEART RATE: 70 BPM | WEIGHT: 205.63 LBS | BODY MASS INDEX: 28.79 KG/M2 | HEIGHT: 71 IN | OXYGEN SATURATION: 96 %

## 2025-01-28 DIAGNOSIS — M10.9 ACUTE GOUT, UNSPECIFIED CAUSE, UNSPECIFIED SITE: ICD-10-CM

## 2025-01-28 DIAGNOSIS — D64.9 ANEMIA, UNSPECIFIED TYPE: ICD-10-CM

## 2025-01-28 DIAGNOSIS — M25.562 LEFT KNEE PAIN, UNSPECIFIED CHRONICITY: ICD-10-CM

## 2025-01-28 DIAGNOSIS — E11.9 TYPE 2 DIABETES MELLITUS WITHOUT COMPLICATION, WITHOUT LONG-TERM CURRENT USE OF INSULIN: ICD-10-CM

## 2025-01-28 DIAGNOSIS — M1A.09X0 IDIOPATHIC CHRONIC GOUT OF MULTIPLE SITES WITHOUT TOPHUS: Primary | ICD-10-CM

## 2025-01-28 LAB
ANION GAP SERPL CALCULATED.3IONS-SCNC: 13 MMOL/L (ref 7–16)
BASOPHILS # BLD AUTO: 0.04 K/UL (ref 0–0.2)
BASOPHILS NFR BLD AUTO: 0.4 % (ref 0–1)
BUN SERPL-MCNC: 26 MG/DL (ref 8–26)
BUN/CREAT SERPL: 18 (ref 6–20)
CALCIUM SERPL-MCNC: 9.2 MG/DL (ref 8.8–10)
CHLORIDE SERPL-SCNC: 111 MMOL/L (ref 98–107)
CHOLEST SERPL-MCNC: 116 MG/DL
CHOLEST/HDLC SERPL: 2.4 {RATIO}
CO2 SERPL-SCNC: 23 MMOL/L (ref 23–31)
CREAT SERPL-MCNC: 1.46 MG/DL (ref 0.72–1.25)
DIFFERENTIAL METHOD BLD: ABNORMAL
EGFR (NO RACE VARIABLE) (RUSH/TITUS): 50 ML/MIN/1.73M2
EOSINOPHIL # BLD AUTO: 0.04 K/UL (ref 0–0.5)
EOSINOPHIL NFR BLD AUTO: 0.4 % (ref 1–4)
ERYTHROCYTE [DISTWIDTH] IN BLOOD BY AUTOMATED COUNT: 16.3 % (ref 11.5–14.5)
EST. AVERAGE GLUCOSE BLD GHB EST-MCNC: 148 MG/DL
GLUCOSE SERPL-MCNC: 68 MG/DL (ref 82–115)
HBA1C MFR BLD HPLC: 6.8 %
HCT VFR BLD AUTO: 41.6 % (ref 40–54)
HDLC SERPL-MCNC: 48 MG/DL (ref 35–60)
HGB BLD-MCNC: 12.6 G/DL (ref 13.5–18)
IMM GRANULOCYTES # BLD AUTO: 0.05 K/UL (ref 0–0.04)
IMM GRANULOCYTES NFR BLD: 0.5 % (ref 0–0.4)
IRON SATN MFR SERPL: 23 % (ref 20–50)
IRON SERPL-MCNC: 45 UG/DL (ref 65–175)
LDLC SERPL CALC-MCNC: 57 MG/DL
LDLC/HDLC SERPL: 1.2 {RATIO}
LYMPHOCYTES # BLD AUTO: 2.53 K/UL (ref 1–4.8)
LYMPHOCYTES NFR BLD AUTO: 25.3 % (ref 27–41)
MCH RBC QN AUTO: 26.3 PG (ref 27–31)
MCHC RBC AUTO-ENTMCNC: 30.3 G/DL (ref 32–36)
MCV RBC AUTO: 86.7 FL (ref 80–96)
MONOCYTES # BLD AUTO: 1.19 K/UL (ref 0–0.8)
MONOCYTES NFR BLD AUTO: 11.9 % (ref 2–6)
MPC BLD CALC-MCNC: 10.6 FL (ref 9.4–12.4)
NEUTROPHILS # BLD AUTO: 6.14 K/UL (ref 1.8–7.7)
NEUTROPHILS NFR BLD AUTO: 61.5 % (ref 53–65)
NONHDLC SERPL-MCNC: 68 MG/DL
NRBC # BLD AUTO: 0 X10E3/UL
NRBC, AUTO (.00): 0 %
PLATELET # BLD AUTO: 330 K/UL (ref 150–400)
POTASSIUM SERPL-SCNC: 4.5 MMOL/L (ref 3.5–5.1)
RBC # BLD AUTO: 4.8 M/UL (ref 4.6–6.2)
SODIUM SERPL-SCNC: 142 MMOL/L (ref 136–145)
TIBC SERPL-MCNC: 150 UG/DL (ref 69–240)
TIBC SERPL-MCNC: 195 UG/DL (ref 250–450)
TRANSFERRIN SERPL-MCNC: 165 MG/DL (ref 163–344)
TRIGL SERPL-MCNC: 55 MG/DL (ref 34–140)
URATE SERPL-MCNC: 8.7 MG/DL (ref 3.5–7.2)
VLDLC SERPL-MCNC: 11 MG/DL
WBC # BLD AUTO: 9.99 K/UL (ref 4.5–11)

## 2025-01-28 PROCEDURE — 96372 THER/PROPH/DIAG INJ SC/IM: CPT | Mod: ,,, | Performed by: NURSE PRACTITIONER

## 2025-01-28 PROCEDURE — 85025 COMPLETE CBC W/AUTO DIFF WBC: CPT | Mod: ,,, | Performed by: CLINICAL MEDICAL LABORATORY

## 2025-01-28 PROCEDURE — 83550 IRON BINDING TEST: CPT | Mod: ,,, | Performed by: CLINICAL MEDICAL LABORATORY

## 2025-01-28 PROCEDURE — 80048 BASIC METABOLIC PNL TOTAL CA: CPT | Mod: ,,, | Performed by: CLINICAL MEDICAL LABORATORY

## 2025-01-28 PROCEDURE — 80061 LIPID PANEL: CPT | Mod: ,,, | Performed by: CLINICAL MEDICAL LABORATORY

## 2025-01-28 PROCEDURE — 84550 ASSAY OF BLOOD/URIC ACID: CPT | Mod: ,,, | Performed by: CLINICAL MEDICAL LABORATORY

## 2025-01-28 PROCEDURE — 83036 HEMOGLOBIN GLYCOSYLATED A1C: CPT | Mod: ,,, | Performed by: CLINICAL MEDICAL LABORATORY

## 2025-01-28 PROCEDURE — 99214 OFFICE O/P EST MOD 30 MIN: CPT | Mod: ,,, | Performed by: NURSE PRACTITIONER

## 2025-01-28 PROCEDURE — 83540 ASSAY OF IRON: CPT | Mod: ,,, | Performed by: CLINICAL MEDICAL LABORATORY

## 2025-01-28 RX ORDER — COLCHICINE 0.6 MG/1
0.6 TABLET ORAL 2 TIMES DAILY
Qty: 180 TABLET | Refills: 1 | Status: SHIPPED | OUTPATIENT
Start: 2025-01-28

## 2025-01-28 RX ORDER — DEXAMETHASONE SODIUM PHOSPHATE 4 MG/ML
4 INJECTION, SOLUTION INTRA-ARTICULAR; INTRALESIONAL; INTRAMUSCULAR; INTRAVENOUS; SOFT TISSUE
Status: COMPLETED | OUTPATIENT
Start: 2025-01-28 | End: 2025-01-28

## 2025-01-28 RX ORDER — DICLOFENAC SODIUM 10 MG/G
4 GEL TOPICAL 2 TIMES DAILY
Qty: 100 G | Refills: 2 | Status: SHIPPED | OUTPATIENT
Start: 2025-01-28

## 2025-01-28 RX ADMIN — DEXAMETHASONE SODIUM PHOSPHATE 4 MG: 4 INJECTION, SOLUTION INTRA-ARTICULAR; INTRALESIONAL; INTRAMUSCULAR; INTRAVENOUS; SOFT TISSUE at 12:01

## 2025-01-29 RX ORDER — FERROUS SULFATE 325(65) MG
325 TABLET ORAL DAILY
Qty: 90 TABLET | Refills: 0 | Status: SHIPPED | OUTPATIENT
Start: 2025-01-29

## 2025-01-30 NOTE — PROGRESS NOTES
ARON Caldera   [facility] [address] [place] [address]  [phone]      PATIENT NAME: Noemi Buckner  : 1950  DATE: 25  MRN: [id number]      Billing Provider: AORN Caldera  Level of Service:   Patient PCP Information       Provider PCP Type    ERIK BATEMAN MD General                Medications and Allergies     Medications  Outpatient Medications Marked as Taking for the 25 encounter (Office Visit) with Sherry Almazan FNP   Medication Sig Dispense Refill    amLODIPine (NORVASC) 2.5 MG tablet Take 1 tablet (2.5 mg total) by mouth once daily. 90 tablet 1    atorvastatin (LIPITOR) 40 MG tablet       cetirizine (ZYRTEC) 10 MG tablet Take 1 tablet (10 mg total) by mouth once daily. 30 tablet 0    clonazePAM (KLONOPIN) 0.5 MG tablet       doxepin (SINEQUAN) 25 MG capsule Take 2 capsules (50 mg total) by mouth nightly. 30 capsule 3    fluticasone propionate (FLONASE) 50 mcg/actuation nasal spray 1 spray (50 mcg total) by Each Nostril route once daily. 15.8 mL 0    latanoprost 0.005 % ophthalmic solution Place 1 drop into the right eye every evening.      metoprolol succinate (TOPROL-XL) 100 MG 24 hr tablet Take 1 tablet (100 mg total) by mouth once daily. 90 tablet 1    nirmatrelvir-ritonavir 150-100 mg DsPk Take 2 tablets twice daily. 20 tablet 0    sildenafiL (VIAGRA) 100 MG tablet Take  mg by mouth daily as needed.      tiZANidine (ZANAFLEX) 4 MG tablet Take 1 tablet (4 mg total) by mouth daily as needed (Muscle spasm). 90 tablet 0    triamcinolone acetonide 0.025% (KENALOG) 0.025 % cream Apply topically 2 (two) times daily. 80 g 1    [DISCONTINUED] colchicine (COLCRYS) 0.6 mg tablet TAKE 1 TABLET BY MOUTH ONCE  DAILY 90 tablet 0       Allergies  Review of patient's allergies indicates:   Allergen Reactions    Allopurinol     Bactrim [sulfamethoxazole-trimethoprim]        History of Present Illness    CHIEF  COMPLAINT:  Patient presents today for gout flare-up to left knee and left foot.     GOUT:  He reports recurrent gout flare-ups, with this year being particularly problematic. During these flare-ups, he has been taking double the prescribed dose of his gout medication. The flare-ups have recurred, with symptoms improving temporarily before returning. His last steroid treatment was approximately one year ago. His left knee is the primary source of joint pain, though he notes improvement in foot pain. He currently manages pain with Bengay and lidocaine patches, reporting the patches are helpful for symptom management.    RENAL:  He is currently under the care of Dr. Thurman for decreased kidney function, with next follow-up visit scheduled in approximately 1-2 months.      ROS:  General: -fever, -chills, -fatigue, -weight gain, -weight loss  Eyes: -vision changes, -redness, -discharge  ENT: -ear pain, -nasal congestion, -sore throat  Cardiovascular: -chest pain, -palpitations, -lower extremity edema  Respiratory: -cough, -shortness of breath  Gastrointestinal: -abdominal pain, -nausea, -vomiting, -diarrhea, -constipation, -blood in stool  Genitourinary: -dysuria, -hematuria, -frequency  Musculoskeletal: +joint pain, -muscle pain  Skin: -rash, -lesion  Neurological: -headache, -dizziness, -numbness, -tingling  Psychiatric: -anxiety, -depression, -sleep difficulty          Physical Exam    General: No acute distress. Well-developed. Well-nourished.  Eyes: EOMI. Sclerae anicteric.  HENT: Normocephalic. Atraumatic.   Cardiovascular: Regular rate. Regular rhythm.   Respiratory: Normal respiratory effort. Clear to auscultation bilaterally.  Abdomen: Soft. Non-tender. Non-distended.   Musculoskeletal: No  obvious deformity.  Extremities: No lower extremity edema.  Neurological: Alert & oriented x3. No slurred speech. Altered gait.  Psychiatric: Normal mood. Normal affect.  Skin: Warm. Dry. No rash.          Assessment & Plan     IMPRESSION:  - Assessed gout flare-ups and kidney function  - Increased colchicine dosage to twice daily, necessitating close monitoring of kidney function  - Will administer steroid injection for immediate relief  - Planned to try topical treatments (Voltaren cream) in addition to current pain management    TYPE 2 DIABETES MELLITUS WITHOUT COMPLICATION, WITHOUT LONG-TERM CURRENT USE OF INSULIN:  - Advised the patient to consume food with medication to prevent GI issues.    CHRONIC KIDNEY DISEASE, STAGE 3A:  - Monitored the patient's kidney function, noting it is on the lower side, requiring careful medication management.  - Emphasized the importance of hydration for kidney health and advised increasing fluid intake to decrease workload on kidneys.  - Discussed the need for caution with colchicine due to compromised kidney function.  - Scheduled a follow-up visit in 2 months with Dr. Hansen to reassess kidney function.    GOUT:  - Monitored patient's gout flare-ups, noting this has been one of the worst years for symptoms.  - Evaluated current treatment regimen and adjusted accordingly.  - Increased colchicine dosage to twice daily.  - Administered a Decadron (Dexamethasone) injection for immediate relief.  - Advised follow-up with Dr. Thurman as previously scheduled (in approximately 1-2 months).    KIDNEY FUNCTION:  - Recommend follow-up with Dr. Hansen in 2 months to monitor kidney function due to increased colchicine dosage.    FOOT PROBLEMS:  - Monitored foot problems and pain, noting some improvement.  - Prescribed Voltaren cream for topical pain relief and advised continued use of lidocaine patches as needed.  - Recommend alternating between Voltaren cream and lidocaine patches for optimal pain management.          Health Maintenance Due   Topic Date Due    Hepatitis C Screening  Never done    Pneumococcal Vaccines (Age 50+) (1 of 2 - PCV) Never done    Influenza Vaccine (1) 09/01/2024    COVID-19 Vaccine (6 -  2024-25 season) 09/01/2024    Diabetes Urine Screening  12/21/2024    Foot Exam  12/21/2024    Diabetic Eye Exam  02/12/2025       Problem List Items Addressed This Visit          Endocrine    Type 2 diabetes mellitus without complication, without long-term current use of insulin    Relevant Orders    Iron and TIBC (Completed)    Basic Metabolic Panel (Completed)    CBC Auto Differential (Completed)    Hemoglobin A1C (Completed)    Lipid Panel (Completed)       Orthopedic    Gout - Primary    Relevant Medications    diclofenac sodium (VOLTAREN ARTHRITIS PAIN) 1 % Gel    colchicine (COLCRYS) 0.6 mg tablet    Other Relevant Orders    Uric Acid (Completed)     Other Visit Diagnoses       Left knee pain, unspecified chronicity        Anemia, unspecified type        Relevant Medications    ferrous sulfate (IRON) 325 mg (65 mg iron) Tab tablet            Health Maintenance Topics with due status: Not Due       Topic Last Completion Date    Colorectal Cancer Screening 01/06/2023    TETANUS VACCINE 10/09/2023    Low Dose Statin 01/28/2025    Lipid Panel 01/28/2025    Hemoglobin A1c 01/28/2025       Future Appointments   Date Time Provider Department Center   3/28/2025 10:00 AM [facility]   5/1/2025  9:00 AM AWV NURSE, [facility]        This note was generated with the assistance of ambient listening technology. Verbal consent was obtained by the patient and accompanying visitor(s) for the recording of patient appointment to facilitate this note. I attest to having reviewed and edited the generated note for accuracy, though some syntax or spelling errors may persist. Please contact the author of this note for any clarification.     Signature:  ARON Caldera  [facility]  [id number] [address] [address]  [phone]    Date of encounter: 1/28/25

## 2025-03-28 ENCOUNTER — OFFICE VISIT (OUTPATIENT)
Dept: FAMILY MEDICINE | Facility: CLINIC | Age: 75
End: 2025-03-28
Payer: MEDICARE

## 2025-03-28 VITALS
HEIGHT: 71 IN | DIASTOLIC BLOOD PRESSURE: 82 MMHG | SYSTOLIC BLOOD PRESSURE: 124 MMHG | TEMPERATURE: 98 F | HEART RATE: 74 BPM | WEIGHT: 209 LBS | BODY MASS INDEX: 29.26 KG/M2 | RESPIRATION RATE: 20 BRPM | OXYGEN SATURATION: 99 %

## 2025-03-28 DIAGNOSIS — E78.5 HYPERLIPIDEMIA, UNSPECIFIED HYPERLIPIDEMIA TYPE: ICD-10-CM

## 2025-03-28 DIAGNOSIS — E11.9 TYPE 2 DIABETES MELLITUS WITHOUT COMPLICATION, WITHOUT LONG-TERM CURRENT USE OF INSULIN: Primary | ICD-10-CM

## 2025-03-28 DIAGNOSIS — I10 PRIMARY HYPERTENSION: ICD-10-CM

## 2025-03-28 PROBLEM — Z12.11 SCREENING FOR COLON CANCER: Status: RESOLVED | Noted: 2023-01-06 | Resolved: 2025-03-28

## 2025-03-28 LAB
CREAT UR-MCNC: 113 MG/DL (ref 23–375)
MICROALBUMIN UR-MCNC: <0.5 MG/DL
MICROALBUMIN/CREAT RATIO PNL UR: NORMAL

## 2025-03-28 PROCEDURE — 82043 UR ALBUMIN QUANTITATIVE: CPT | Mod: ,,, | Performed by: CLINICAL MEDICAL LABORATORY

## 2025-03-28 PROCEDURE — 82570 ASSAY OF URINE CREATININE: CPT | Mod: ,,, | Performed by: CLINICAL MEDICAL LABORATORY

## 2025-03-28 RX ORDER — ASPIRIN 81 MG/1
81 TABLET ORAL
COMMUNITY
Start: 2024-05-01

## 2025-03-28 RX ORDER — CLONAZEPAM 0.5 MG/1
TABLET ORAL
Status: CANCELLED | OUTPATIENT
Start: 2025-03-28

## 2025-03-28 NOTE — PROGRESS NOTES
Progress Note     ERIK BATEMAN MD   [address] [place] [address]     PATIENT NAME: Noemi Buckner  : 1950  DATE: 3/28/25  MRN: [id number]      Billing Provider: ERIK BATEMAN MD  Level of Service: [place] OFFICE/OUTPT VISIT, EST, LEVL IV, 30-39 MIN  Patient PCP Information       Provider PCP Type    ERIK BATEMAN MD General                Follow-up (Patient is to be seen for two month follow up and medication refills./Pt is fasting in case labs are due.), Gout (Patient was seen two months ago for gout flare, he states its bee a lot better.), and Health Maintenance (Hepatitis C Screening Never done/Pneumococcal Vaccines (Age 50+)(1 of 2 - PCV) Never done/Influenza Vaccine(1) due on 2024/COVID-19 Vaccine( season) due on 2024/Diabetes Urine Screening due on 2024/Foot Exam due on 2024//Discussed care gaps with pt. Pt states he's had all his vaccines done this year at [facility]. Pt do not remember the exact date. )      SUBJECTIVE:     Noemi Buckner is a 74 y.o.male who presents to clinic for Follow-up (Patient is to be seen for two month follow up and medication refills./Pt is fasting in case labs are due.), Gout (Patient was seen two months ago for gout flare, he states its bee a lot better.), and Health Maintenance (Hepatitis C Screening Never done/Pneumococcal Vaccines (Age 50+)(1 of 2 - PCV) Never done/Influenza Vaccine(1) due on 2024/COVID-19 Vaccine( season) due on 2024/Diabetes Urine Screening due on 2024/Foot Exam due on 2024//Discussed care gaps with pt. Pt states he's had all his vaccines done this year at [facility]. Pt do not remember the exact date. )    Presents to clinic today for routine follow up.  Patient's history of type 2 diabetes which is currently diet-controlled.  Patient is due for foot exam and microalbumin creatinine ratio.  He is up-to-date  "on his hemoglobin A1c.  It was obtained on 01/28 and was well-controlled.  He is trying to monitor his diet closely.    Patient is followed by Psychiatry and is prescribed Klonopin.  Patient would like us to refill this medication.  However, discussed that he would need to follow up with Psychiatry for any refills of controlled substances.  He verbalized understanding.    Patient also has a history of hypertension in his followed by Cardiology for this concern.  His blood pressure is currently well-controlled.  He does not need any refills at this time.         All other pertinent review of systems negative. Please see HPI for details.     Past Medical History:  has a past medical history of Anxiety, Arthritis, Gout (a), Hyperlipidemia, Hypertension, Insomnia, and Male erectile dysfunction, unspecified.   Past Surgical History:  has a past surgical history that includes Circumcision; Shoulder surgery (Bilateral); and Knee arthroscopy (Right).  Family History: family history is not on file.  Social History:  reports that he has never smoked. He has been exposed to tobacco smoke. He has never used smokeless tobacco. He reports that he does not drink alcohol and does not use drugs.  Allergies:   Review of patient's allergies indicates:   Allergen Reactions    Allopurinol Rash    Sulfamethoxazole-trimethoprim Rash       Current Medications[1]   OBJECTIVE:     Vital Signs   /82 (BP Location: Left arm, Patient Position: Sitting)   Pulse 74   Temp 98 °F (36.7 °C)   Resp 20   Ht 5' 11" (1.803 m)   Wt 94.8 kg (209 lb)   SpO2 99%   BMI 29.15 kg/m²     Physical Exam  Constitutional:       General: He is not in acute distress.     Appearance: Normal appearance. He is not ill-appearing.   HENT:      Head: Normocephalic and atraumatic.   Eyes:      Extraocular Movements: Extraocular movements intact.      Pupils: Pupils are equal, round, and reactive to light.   Cardiovascular:      Rate and Rhythm: Normal rate and " regular rhythm.      Pulses:           Dorsalis pedis pulses are 2+ on the right side and 2+ on the left side.        Posterior tibial pulses are 2+ on the right side and 2+ on the left side.      Heart sounds: No murmur heard.     No friction rub. No gallop.   Pulmonary:      Effort: Pulmonary effort is normal. No respiratory distress.      Breath sounds: Normal breath sounds. No wheezing, rhonchi or rales.   Abdominal:      Palpations: Abdomen is soft.      Tenderness: There is no abdominal tenderness. There is no guarding or rebound.   Musculoskeletal:         General: Normal range of motion.      Right foot: Normal range of motion.      Left foot: Normal range of motion.   Feet:      Right foot:      Protective Sensation: 10 sites tested.  10 sites sensed.      Skin integrity: Skin integrity normal.      Toenail Condition: Right toenails are abnormally thick and long.      Left foot:      Protective Sensation: 10 sites tested.  10 sites sensed.      Skin integrity: Skin integrity normal.      Toenail Condition: Left toenails are abnormally thick and long.   Skin:     General: Skin is warm and dry.      Capillary Refill: Capillary refill takes less than 2 seconds.   Neurological:      General: No focal deficit present.      Mental Status: He is alert.   Psychiatric:         Mood and Affect: Mood normal.         Behavior: Behavior normal.         ASSESSMENT/PLAN:     1. Type 2 diabetes mellitus without complication, without long-term current use of insulin  -     Microalbumin/creatinine urine ratio  Foot exam performed today.  Obtained urine microalbumin creatinine ratio.  Patient to continue to monitor diet and exercise.      2. Primary hypertension  Patient has a history of hypertension.  Blood pressure currently well-controlled metoprolol and amlodipine.  Patient may continue.    3. Hyperlipidemia, unspecified hyperlipidemia type  Patient up-to-date on lipid panel.  Patient on atorvastatin 40 mg.  Patient may  continue.      Follow up for Next scheduled appointment.      ERIK BATEMAN MD  03/28/2025    Due to voice recognition software, sound alike and misspelled words may be contained in the documentation.              [1]   Current Outpatient Medications:     amLODIPine (NORVASC) 2.5 MG tablet, Take 1 tablet (2.5 mg total) by mouth once daily., Disp: 90 tablet, Rfl: 1    aspirin (ECOTRIN) 81 MG EC tablet, 81 mg., Disp: , Rfl:     atorvastatin (LIPITOR) 40 MG tablet, , Disp: , Rfl:     cetirizine (ZYRTEC) 10 MG tablet, Take 1 tablet (10 mg total) by mouth once daily., Disp: 30 tablet, Rfl: 0    clonazePAM (KLONOPIN) 0.5 MG tablet, , Disp: , Rfl:     colchicine (COLCRYS) 0.6 mg tablet, Take 1 tablet (0.6 mg total) by mouth 2 (two) times daily., Disp: 180 tablet, Rfl: 1    diclofenac sodium (VOLTAREN ARTHRITIS PAIN) 1 % Gel, Apply 4 g topically 2 (two) times daily., Disp: 100 g, Rfl: 2    doxepin (SINEQUAN) 25 MG capsule, Take 2 capsules (50 mg total) by mouth nightly., Disp: 30 capsule, Rfl: 3    ferrous sulfate (IRON) 325 mg (65 mg iron) Tab tablet, Take 1 tablet (325 mg total) by mouth once daily., Disp: 90 tablet, Rfl: 0    fluticasone propionate (FLONASE) 50 mcg/actuation nasal spray, 1 spray (50 mcg total) by Each Nostril route once daily., Disp: 15.8 mL, Rfl: 0    latanoprost 0.005 % ophthalmic solution, Place 1 drop into the right eye every evening., Disp: , Rfl:     metoprolol succinate (TOPROL-XL) 100 MG 24 hr tablet, Take 1 tablet (100 mg total) by mouth once daily., Disp: 90 tablet, Rfl: 1    sildenafiL (VIAGRA) 100 MG tablet, Take  mg by mouth daily as needed., Disp: , Rfl:     tiZANidine (ZANAFLEX) 4 MG tablet, Take 1 tablet (4 mg total) by mouth daily as needed (Muscle spasm)., Disp: 90 tablet, Rfl: 0    triamcinolone acetonide 0.025% (KENALOG) 0.025 % cream, Apply topically 2 (two) times daily., Disp: 80 g, Rfl: 1

## 2025-05-07 ENCOUNTER — OFFICE VISIT (OUTPATIENT)
Dept: FAMILY MEDICINE | Facility: CLINIC | Age: 75
End: 2025-05-07
Payer: MEDICARE

## 2025-05-07 VITALS
SYSTOLIC BLOOD PRESSURE: 110 MMHG | HEIGHT: 71 IN | BODY MASS INDEX: 29.54 KG/M2 | DIASTOLIC BLOOD PRESSURE: 72 MMHG | WEIGHT: 211 LBS | HEART RATE: 68 BPM | RESPIRATION RATE: 18 BRPM | OXYGEN SATURATION: 99 % | TEMPERATURE: 98 F

## 2025-05-07 DIAGNOSIS — R05.1 ACUTE COUGH: ICD-10-CM

## 2025-05-07 DIAGNOSIS — J32.9 SINUSITIS, UNSPECIFIED CHRONICITY, UNSPECIFIED LOCATION: Primary | ICD-10-CM

## 2025-05-07 LAB
CTP QC/QA: YES
CTP QC/QA: YES
POC MOLECULAR INFLUENZA A AGN: NEGATIVE
POC MOLECULAR INFLUENZA B AGN: NEGATIVE
SARS-COV-2 RDRP RESP QL NAA+PROBE: NEGATIVE

## 2025-05-07 PROCEDURE — 87502 INFLUENZA DNA AMP PROBE: CPT | Mod: RHCUB | Performed by: STUDENT IN AN ORGANIZED HEALTH CARE EDUCATION/TRAINING PROGRAM

## 2025-05-07 PROCEDURE — 87635 SARS-COV-2 COVID-19 AMP PRB: CPT | Mod: RHCUB | Performed by: STUDENT IN AN ORGANIZED HEALTH CARE EDUCATION/TRAINING PROGRAM

## 2025-05-07 PROCEDURE — 99213 OFFICE O/P EST LOW 20 MIN: CPT | Mod: ,,, | Performed by: STUDENT IN AN ORGANIZED HEALTH CARE EDUCATION/TRAINING PROGRAM

## 2025-05-07 RX ORDER — CETIRIZINE HYDROCHLORIDE 10 MG/1
10 TABLET ORAL DAILY
Qty: 30 TABLET | Refills: 0 | Status: SHIPPED | OUTPATIENT
Start: 2025-05-07 | End: 2026-05-07

## 2025-05-07 RX ORDER — BENZONATATE 100 MG/1
100 CAPSULE ORAL 3 TIMES DAILY PRN
Qty: 30 CAPSULE | Refills: 0 | Status: SHIPPED | OUTPATIENT
Start: 2025-05-07 | End: 2025-05-17

## 2025-05-07 RX ORDER — FLUTICASONE PROPIONATE 50 MCG
1 SPRAY, SUSPENSION (ML) NASAL DAILY
Qty: 15.8 ML | Refills: 0 | Status: SHIPPED | OUTPATIENT
Start: 2025-05-07

## 2025-05-07 RX ORDER — AMOXICILLIN AND CLAVULANATE POTASSIUM 875; 125 MG/1; MG/1
1 TABLET, FILM COATED ORAL 2 TIMES DAILY
Qty: 20 TABLET | Refills: 0 | Status: SHIPPED | OUTPATIENT
Start: 2025-05-07 | End: 2025-05-17

## 2025-05-07 NOTE — PROGRESS NOTES
Progress Note     ERIK BATEMAN MD   [address] [place] [address]     PATIENT NAME: Noemi Buckner  : 1950  DATE: 25  MRN: [id number]      Billing Provider: ERIK BATEMAN MD  Level of Service:   Patient PCP Information       Provider PCP Type    ERIK BATEMAN MD General                Cough (Pt presents to clinic with cough and chest cold. He states it started last Saturday. He states his cough is dry. No fever reported. He tried Cold and Flu OTC meds and sinus meds but they did not provide any relief. ) and Health Maintenance (Discussed care gaps with pt. Declines all vaccines at this time. )      SUBJECTIVE:     Noemi Buckner is a 74 y.o.male who presents to clinic for Cough (Pt presents to clinic with cough and chest cold. He states it started last Saturday. He states his cough is dry. No fever reported. He tried Cold and Flu OTC meds and sinus meds but they did not provide any relief. ) and Health Maintenance (Discussed care gaps with pt. Declines all vaccines at this time. )    Patient presents to clinic today with URI symptoms.  Patient notes onset on Friday.  Patient reports head and chest congestion.  He also reports some left ear fullness.  He has been coughing but has not had any significant purulent sputum production.  He has been taking over-the-counter meds without improvement.  He has been afebrile.  He has some sinus pressure but denies any significant pain at this time.  Does feel like it is moving into his chest.  However, he denies any difficulty breathing.        All other pertinent review of systems negative. Please see HPI for details.     Past Medical History:  has a past medical history of Anxiety, Arthritis, Gout (a), Hyperlipidemia, Hypertension, Insomnia, and Male erectile dysfunction, unspecified.   Past Surgical History:  has a past surgical history that includes Circumcision; Shoulder surgery (Bilateral);  "and Knee arthroscopy (Right).  Family History: family history is not on file.  Social History:  reports that he has never smoked. He has been exposed to tobacco smoke. He has never used smokeless tobacco. He reports that he does not drink alcohol and does not use drugs.  Allergies:   Review of patient's allergies indicates:   Allergen Reactions    Allopurinol Rash    Sulfamethoxazole-trimethoprim Rash       Current Medications[1]   OBJECTIVE:     Vital Signs   /72 (BP Location: Left arm, Patient Position: Sitting)   Pulse 68   Temp 98.3 °F (36.8 °C) (Oral)   Resp 18   Ht 5' 11" (1.803 m)   Wt 95.7 kg (211 lb)   SpO2 99%   BMI 29.43 kg/m²     Physical Exam  Constitutional:       General: He is not in acute distress.     Appearance: Normal appearance. He is not ill-appearing.   HENT:      Head: Normocephalic and atraumatic.      Right Ear: Tympanic membrane normal.      Left Ear: Tympanic membrane normal.      Nose: Congestion present. No rhinorrhea.      Mouth/Throat:      Pharynx: No oropharyngeal exudate or posterior oropharyngeal erythema.   Eyes:      Extraocular Movements: Extraocular movements intact.      Pupils: Pupils are equal, round, and reactive to light.   Cardiovascular:      Rate and Rhythm: Normal rate and regular rhythm.      Heart sounds: No murmur heard.     No friction rub. No gallop.   Pulmonary:      Effort: Pulmonary effort is normal. No respiratory distress.      Breath sounds: Normal breath sounds. No wheezing, rhonchi or rales.   Abdominal:      Palpations: Abdomen is soft.      Tenderness: There is no abdominal tenderness. There is no guarding or rebound.   Musculoskeletal:         General: Normal range of motion.   Skin:     General: Skin is warm and dry.      Capillary Refill: Capillary refill takes less than 2 seconds.   Neurological:      General: No focal deficit present.      Mental Status: He is alert.   Psychiatric:         Mood and Affect: Mood normal.         " Behavior: Behavior normal.         ASSESSMENT/PLAN:     1. Sinusitis, unspecified chronicity, unspecified location  -     amoxicillin-clavulanate 875-125mg (AUGMENTIN) 875-125 mg per tablet; Take 1 tablet by mouth 2 (two) times daily. for 10 days  Dispense: 20 tablet; Refill: 0  -     benzonatate (TESSALON) 100 MG capsule; Take 1 capsule (100 mg total) by mouth 3 (three) times daily as needed for Cough.  Dispense: 30 capsule; Refill: 0  -     fluticasone propionate (FLONASE) 50 mcg/actuation nasal spray; 1 spray (50 mcg total) by Each Nostril route once daily.  Dispense: 15.8 mL; Refill: 0  -     cetirizine (ZYRTEC) 10 MG tablet; Take 1 tablet (10 mg total) by mouth once daily.  Dispense: 30 tablet; Refill: 0    2. Acute cough  -     POCT COVID-19 Rapid Screening  -     POCT Influenza A/B Molecular  -     X-Ray Chest PA And Lateral; Future; Expected date: 05/07/2025        Obtaining chest x-ray given patient has a associated chest congestion.  Treating for potential bacterial component given worsening of symptoms.  Prescription for Augmentin provided.  Also treating symptoms with Tessalon Perles, Flonase, and Zyrtec.  Patient to follow up if symptoms worsen or fail to improve.  If chest x-ray shows pneumonia, we will add doxycycline versus Z-Benedict to his medication regimen.    Follow up if symptoms worsen or fail to improve.      ERIK BATEMAN MD  05/07/2025    Due to voice recognition software, sound alike and misspelled words may be contained in the documentation.              [1]   Current Outpatient Medications:     amLODIPine (NORVASC) 2.5 MG tablet, Take 1 tablet (2.5 mg total) by mouth once daily., Disp: 90 tablet, Rfl: 1    aspirin (ECOTRIN) 81 MG EC tablet, 81 mg., Disp: , Rfl:     atorvastatin (LIPITOR) 40 MG tablet, , Disp: , Rfl:     clonazePAM (KLONOPIN) 0.5 MG tablet, , Disp: , Rfl:     colchicine (COLCRYS) 0.6 mg tablet, Take 1 tablet (0.6 mg total) by mouth 2 (two) times daily., Disp: 180  tablet, Rfl: 1    diclofenac sodium (VOLTAREN ARTHRITIS PAIN) 1 % Gel, Apply 4 g topically 2 (two) times daily., Disp: 100 g, Rfl: 2    doxepin (SINEQUAN) 25 MG capsule, Take 2 capsules (50 mg total) by mouth nightly., Disp: 30 capsule, Rfl: 3    ferrous sulfate (IRON) 325 mg (65 mg iron) Tab tablet, Take 1 tablet (325 mg total) by mouth once daily., Disp: 90 tablet, Rfl: 0    metoprolol succinate (TOPROL-XL) 100 MG 24 hr tablet, Take 1 tablet (100 mg total) by mouth once daily., Disp: 90 tablet, Rfl: 1    sildenafiL (VIAGRA) 100 MG tablet, Take  mg by mouth daily as needed., Disp: , Rfl:     tiZANidine (ZANAFLEX) 4 MG tablet, Take 1 tablet (4 mg total) by mouth daily as needed (Muscle spasm)., Disp: 90 tablet, Rfl: 0    amoxicillin-clavulanate 875-125mg (AUGMENTIN) 875-125 mg per tablet, Take 1 tablet by mouth 2 (two) times daily. for 10 days, Disp: 20 tablet, Rfl: 0    benzonatate (TESSALON) 100 MG capsule, Take 1 capsule (100 mg total) by mouth 3 (three) times daily as needed for Cough., Disp: 30 capsule, Rfl: 0    cetirizine (ZYRTEC) 10 MG tablet, Take 1 tablet (10 mg total) by mouth once daily., Disp: 30 tablet, Rfl: 0    fluticasone propionate (FLONASE) 50 mcg/actuation nasal spray, 1 spray (50 mcg total) by Each Nostril route once daily., Disp: 15.8 mL, Rfl: 0    latanoprost 0.005 % ophthalmic solution, Place 1 drop into the right eye every evening., Disp: , Rfl:     triamcinolone acetonide 0.025% (KENALOG) 0.025 % cream, Apply topically 2 (two) times daily., Disp: 80 g, Rfl: 1

## 2025-05-08 ENCOUNTER — RESULTS FOLLOW-UP (OUTPATIENT)
Dept: FAMILY MEDICINE | Facility: CLINIC | Age: 75
End: 2025-05-08
Payer: MEDICARE

## 2025-05-08 DIAGNOSIS — J18.9 COMMUNITY ACQUIRED PNEUMONIA, UNSPECIFIED LATERALITY: Primary | ICD-10-CM

## 2025-05-08 RX ORDER — AZITHROMYCIN 250 MG/1
TABLET, FILM COATED ORAL
Qty: 6 TABLET | Refills: 0 | Status: SHIPPED | OUTPATIENT
Start: 2025-05-08 | End: 2025-05-13

## 2025-05-23 DIAGNOSIS — M62.838 MUSCLE SPASM: ICD-10-CM

## 2025-05-27 RX ORDER — TIZANIDINE 4 MG/1
4 TABLET ORAL DAILY PRN
Qty: 90 TABLET | Refills: 0 | Status: SHIPPED | OUTPATIENT
Start: 2025-05-27

## 2025-07-01 ENCOUNTER — OFFICE VISIT (OUTPATIENT)
Dept: FAMILY MEDICINE | Facility: CLINIC | Age: 75
End: 2025-07-01
Payer: MEDICARE

## 2025-07-01 VITALS
SYSTOLIC BLOOD PRESSURE: 126 MMHG | TEMPERATURE: 98 F | HEART RATE: 69 BPM | RESPIRATION RATE: 18 BRPM | OXYGEN SATURATION: 96 % | BODY MASS INDEX: 30.15 KG/M2 | HEIGHT: 71 IN | WEIGHT: 215.38 LBS | DIASTOLIC BLOOD PRESSURE: 80 MMHG

## 2025-07-01 DIAGNOSIS — E11.9 TYPE 2 DIABETES MELLITUS WITHOUT COMPLICATION, WITHOUT LONG-TERM CURRENT USE OF INSULIN: Primary | ICD-10-CM

## 2025-07-01 DIAGNOSIS — E78.5 HYPERLIPIDEMIA, UNSPECIFIED HYPERLIPIDEMIA TYPE: ICD-10-CM

## 2025-07-01 DIAGNOSIS — D64.9 ANEMIA, UNSPECIFIED TYPE: ICD-10-CM

## 2025-07-01 DIAGNOSIS — M1A.09X0 IDIOPATHIC CHRONIC GOUT OF MULTIPLE SITES WITHOUT TOPHUS: ICD-10-CM

## 2025-07-01 DIAGNOSIS — I10 PRIMARY HYPERTENSION: ICD-10-CM

## 2025-07-01 DIAGNOSIS — M10.9 ACUTE GOUT, UNSPECIFIED CAUSE, UNSPECIFIED SITE: ICD-10-CM

## 2025-07-01 DIAGNOSIS — M10.00 ACUTE IDIOPATHIC GOUT, UNSPECIFIED SITE: ICD-10-CM

## 2025-07-01 DIAGNOSIS — Z91.09 ENVIRONMENTAL ALLERGIES: ICD-10-CM

## 2025-07-01 DIAGNOSIS — I73.9 PVD (PERIPHERAL VASCULAR DISEASE): ICD-10-CM

## 2025-07-01 DIAGNOSIS — I10 HYPERTENSION, UNSPECIFIED TYPE: ICD-10-CM

## 2025-07-01 LAB
ALBUMIN SERPL BCP-MCNC: 3.5 G/DL (ref 3.4–4.8)
ALBUMIN/GLOB SERPL: 0.8 {RATIO}
ALP SERPL-CCNC: 85 U/L (ref 40–150)
ALT SERPL W P-5'-P-CCNC: 19 U/L
ANION GAP SERPL CALCULATED.3IONS-SCNC: 8 MMOL/L (ref 7–16)
AST SERPL W P-5'-P-CCNC: 35 U/L (ref 11–45)
BASOPHILS # BLD AUTO: 0.05 K/UL (ref 0–0.2)
BASOPHILS NFR BLD AUTO: 1.2 % (ref 0–1)
BILIRUB SERPL-MCNC: 0.4 MG/DL
BUN SERPL-MCNC: 17 MG/DL (ref 8–26)
BUN/CREAT SERPL: 13 (ref 6–20)
CALCIUM SERPL-MCNC: 8.7 MG/DL (ref 8.8–10)
CHLORIDE SERPL-SCNC: 111 MMOL/L (ref 98–107)
CHOLEST SERPL-MCNC: 129 MG/DL
CHOLEST/HDLC SERPL: 3.9 {RATIO}
CO2 SERPL-SCNC: 24 MMOL/L (ref 23–31)
CREAT SERPL-MCNC: 1.36 MG/DL (ref 0.72–1.25)
DIFFERENTIAL METHOD BLD: ABNORMAL
EGFR (NO RACE VARIABLE) (RUSH/TITUS): 54 ML/MIN/1.73M2
EOSINOPHIL # BLD AUTO: 0.21 K/UL (ref 0–0.5)
EOSINOPHIL NFR BLD AUTO: 5.2 % (ref 1–4)
ERYTHROCYTE [DISTWIDTH] IN BLOOD BY AUTOMATED COUNT: 16 % (ref 11.5–14.5)
EST. AVERAGE GLUCOSE BLD GHB EST-MCNC: 137 MG/DL
FERRITIN SERPL-MCNC: 121 NG/ML (ref 22–275)
GLOBULIN SER-MCNC: 4.2 G/DL (ref 2–4)
GLUCOSE SERPL-MCNC: 95 MG/DL (ref 82–115)
HBA1C MFR BLD HPLC: 6.4 %
HCT VFR BLD AUTO: 39.8 % (ref 40–54)
HDLC SERPL-MCNC: 33 MG/DL (ref 35–60)
HGB BLD-MCNC: 12 G/DL (ref 13.5–18)
IMM GRANULOCYTES # BLD AUTO: 0.01 K/UL (ref 0–0.04)
IMM GRANULOCYTES NFR BLD: 0.2 % (ref 0–0.4)
IRON SATN MFR SERPL: 24 % (ref 20–50)
IRON SERPL-MCNC: 53 UG/DL (ref 65–175)
LDLC SERPL CALC-MCNC: 75 MG/DL
LDLC/HDLC SERPL: 2.3 {RATIO}
LYMPHOCYTES # BLD AUTO: 2.09 K/UL (ref 1–4.8)
LYMPHOCYTES NFR BLD AUTO: 51.6 % (ref 27–41)
MCH RBC QN AUTO: 26.3 PG (ref 27–31)
MCHC RBC AUTO-ENTMCNC: 30.2 G/DL (ref 32–36)
MCV RBC AUTO: 87.3 FL (ref 80–96)
MONOCYTES # BLD AUTO: 0.55 K/UL (ref 0–0.8)
MONOCYTES NFR BLD AUTO: 13.6 % (ref 2–6)
MPC BLD CALC-MCNC: 11.1 FL (ref 9.4–12.4)
NEUTROPHILS # BLD AUTO: 1.14 K/UL (ref 1.8–7.7)
NEUTROPHILS NFR BLD AUTO: 28.2 % (ref 53–65)
NONHDLC SERPL-MCNC: 96 MG/DL
NRBC # BLD AUTO: 0 X10E3/UL
NRBC, AUTO (.00): 0 %
PLATELET # BLD AUTO: 220 K/UL (ref 150–400)
POTASSIUM SERPL-SCNC: 4.4 MMOL/L (ref 3.5–5.1)
PROT SERPL-MCNC: 7.7 G/DL (ref 5.8–7.6)
RBC # BLD AUTO: 4.56 M/UL (ref 4.6–6.2)
SODIUM SERPL-SCNC: 139 MMOL/L (ref 136–145)
TIBC SERPL-MCNC: 170 UG/DL (ref 69–240)
TIBC SERPL-MCNC: 223 UG/DL (ref 250–450)
TRANSFERRIN SERPL-MCNC: 201 MG/DL (ref 163–344)
TRIGL SERPL-MCNC: 107 MG/DL (ref 34–140)
URATE SERPL-MCNC: 10.3 MG/DL (ref 3.5–7.2)
VLDLC SERPL-MCNC: 21 MG/DL
WBC # BLD AUTO: 4.05 K/UL (ref 4.5–11)

## 2025-07-01 PROCEDURE — 80053 COMPREHEN METABOLIC PANEL: CPT | Mod: ,,, | Performed by: CLINICAL MEDICAL LABORATORY

## 2025-07-01 PROCEDURE — 83550 IRON BINDING TEST: CPT | Mod: ,,, | Performed by: CLINICAL MEDICAL LABORATORY

## 2025-07-01 PROCEDURE — 82728 ASSAY OF FERRITIN: CPT | Mod: ,,, | Performed by: CLINICAL MEDICAL LABORATORY

## 2025-07-01 PROCEDURE — 80061 LIPID PANEL: CPT | Mod: ,,, | Performed by: CLINICAL MEDICAL LABORATORY

## 2025-07-01 PROCEDURE — 83036 HEMOGLOBIN GLYCOSYLATED A1C: CPT | Mod: ,,, | Performed by: CLINICAL MEDICAL LABORATORY

## 2025-07-01 PROCEDURE — 83540 ASSAY OF IRON: CPT | Mod: ,,, | Performed by: CLINICAL MEDICAL LABORATORY

## 2025-07-01 PROCEDURE — 85025 COMPLETE CBC W/AUTO DIFF WBC: CPT | Mod: ,,, | Performed by: CLINICAL MEDICAL LABORATORY

## 2025-07-01 PROCEDURE — 84550 ASSAY OF BLOOD/URIC ACID: CPT | Mod: ,,, | Performed by: CLINICAL MEDICAL LABORATORY

## 2025-07-01 PROCEDURE — 99214 OFFICE O/P EST MOD 30 MIN: CPT | Mod: ,,, | Performed by: NURSE PRACTITIONER

## 2025-07-01 RX ORDER — COLCHICINE 0.6 MG/1
0.6 TABLET ORAL 2 TIMES DAILY
Qty: 180 TABLET | Refills: 1 | Status: SHIPPED | OUTPATIENT
Start: 2025-07-01

## 2025-07-01 RX ORDER — EPINEPHRINE 0.3 MG/.3ML
1 INJECTION SUBCUTANEOUS ONCE
Qty: 0.3 ML | Refills: 1 | Status: SHIPPED | OUTPATIENT
Start: 2025-07-01 | End: 2025-07-01

## 2025-07-01 RX ORDER — PREDNISONE 20 MG/1
20 TABLET ORAL 2 TIMES DAILY
Qty: 10 TABLET | Refills: 1 | Status: SHIPPED | OUTPATIENT
Start: 2025-07-01

## 2025-07-01 RX ORDER — PREDNISONE 20 MG/1
TABLET ORAL
COMMUNITY
End: 2025-07-01

## 2025-07-01 NOTE — PROGRESS NOTES
ARON Caldera   [facility] [id number] [address] [address]  [phone]      PATIENT NAME: Noemi Buckner  : 1950  DATE: 25  MRN: [id number]      Billing Provider: ARON Caldera  Level of Service:   Patient PCP Information       Provider PCP Type    ERIK BATEMAN MD General            History of Present Illness    HPI:  Patient presents for routine follow up and refills. He reports frequent gout attacks, primarily affecting his lower extremities. He requests a prednisone refill to manage these flare-ups. He has been using a 20 mg dose of prednisone, taking 2 tablets twice daily for about 5 days during flare-ups, which has been effective in managing his symptoms.    He has type 2 diabetes, currently controlled. His last A1C in December was 6.8. He is not on diabetes medication but follows a diabetic diet at home.    His hypertension is stable with a current blood pressure reading of 126/80. He monitors his blood pressure at home.    He has hyperlipidemia and is currently taking atorvastatin 40 mg daily. He follows a low cholesterol, low-fat diet at home.    He has peripheral vascular disease (PVD), which is followed by Dr. Kaplan. He missed his recent appointment for an arterial Doppler. He has some decreased sensation in his lower extremities at times. He currently has mild swelling (described as +1 edema) in his lower extremities. He wears compression hose daily, which helps manage the swelling.    He has environmental allergies, but the specific allergen is unknown despite multiple tests. He requests an EpiPen refill due to these allergies. He and his wife state that he has had allergic reactions in the past, but they are unsure of the exact trigger.    MEDICAL HISTORY:  Patient has a history of gout, Type 2 Diabetes, hypertension, hyperlipidemia, Peripheral Vascular Disease (PVD), and environmental allergies.    TEST  RESULTS:  Patient's A1C level was 6.8 in December, indicating controlled diabetes. Patient underwent an PATITO (Ankle-Brachial Index) test in 2022.      ROS:  General: -fever, -chills, -fatigue, -weight gain, -weight loss  Eyes: -vision changes, -redness, -discharge  ENT: -ear pain, -nasal congestion, -sore throat  Cardiovascular: -chest pain, -palpitations, +lower extremity edema  Respiratory: -cough, -shortness of breath  Gastrointestinal: -abdominal pain, -nausea, -vomiting, -diarrhea, -constipation, -blood in stool  Genitourinary: -dysuria, -hematuria, -frequency  Musculoskeletal: +joint pain, -muscle pain  Skin: -rash, -lesion  Neurological: -headache, -dizziness, -numbness, -tingling, +decreased sensation in extremities  Psychiatric: -anxiety, -depression, -sleep difficulty  Allergic: +allergic reactions          Medications and Allergies     Medications  Outpatient Medications Marked as Taking for the 7/1/25 encounter (Office Visit) with Sherry Almazan FNP   Medication Sig Dispense Refill    amLODIPine (NORVASC) 2.5 MG tablet Take 1 tablet (2.5 mg total) by mouth once daily. 90 tablet 1    aspirin (ECOTRIN) 81 MG EC tablet 81 mg.      atorvastatin (LIPITOR) 40 MG tablet       cetirizine (ZYRTEC) 10 MG tablet Take 1 tablet (10 mg total) by mouth once daily. 30 tablet 0    clonazePAM (KLONOPIN) 0.5 MG tablet       diclofenac sodium (VOLTAREN ARTHRITIS PAIN) 1 % Gel Apply 4 g topically 2 (two) times daily. 100 g 2    doxepin (SINEQUAN) 25 MG capsule Take 2 capsules (50 mg total) by mouth nightly. 30 capsule 3    ferrous sulfate (IRON) 325 mg (65 mg iron) Tab tablet Take 1 tablet (325 mg total) by mouth once daily. 90 tablet 0    fluticasone propionate (FLONASE) 50 mcg/actuation nasal spray 1 spray (50 mcg total) by Each Nostril route once daily. 15.8 mL 0    latanoprost 0.005 % ophthalmic solution Place 1 drop into the right eye every evening.      metoprolol succinate (TOPROL-XL) 100 MG 24 hr tablet Take 1 tablet  (100 mg total) by mouth once daily. 90 tablet 1    sildenafiL (VIAGRA) 100 MG tablet Take  mg by mouth daily as needed.      tiZANidine (ZANAFLEX) 4 MG tablet Take 1 tablet (4 mg total) by mouth daily as needed (Muscle spasm). 90 tablet 0    triamcinolone acetonide 0.025% (KENALOG) 0.025 % cream Apply topically 2 (two) times daily. 80 g 1    [DISCONTINUED] colchicine (COLCRYS) 0.6 mg tablet Take 1 tablet (0.6 mg total) by mouth 2 (two) times daily. 180 tablet 1    [DISCONTINUED] predniSONE (DELTASONE) 20 MG tablet          Allergies  Review of patient's allergies indicates:   Allergen Reactions    Allopurinol Rash    Sulfamethoxazole-trimethoprim Rash       Physical Exam  Constitutional:       General: He is not in acute distress.  HENT:      Head: Normocephalic.      Nose: Nose normal.      Mouth/Throat:      Mouth: Mucous membranes are moist.   Eyes:      Extraocular Movements: Extraocular movements intact.   Cardiovascular:      Rate and Rhythm: Normal rate.   Pulmonary:      Effort: Pulmonary effort is normal. No respiratory distress.   Abdominal:      General: Bowel sounds are normal.      Palpations: Abdomen is soft.      Tenderness: There is no abdominal tenderness.   Musculoskeletal:      Cervical back: Normal range of motion.   Skin:     General: Skin is warm.   Neurological:      Mental Status: He is alert and oriented to person, place, and time.   Psychiatric:         Behavior: Behavior normal.          Assessment & Plan    IMPRESSION:  - Gout: Frequent attacks primarily affecting lower extremities.  - Diabetes: Last A1C of 6.8 in December indicating adequate management.  - HTN: Stable with current BP of 126/80.  - PVD: Missed appointment with Dr. Kaplan.  - Environmental allergies: Unidentified triggers despite extensive testing.    GOUT:  - Patient reports frequent gout attacks, mostly affecting his lower extremities.  - Prescribed prednisone 20 mg to be taken twice daily for 5 days during gout  flare-ups, which has been effective in managing symptoms based on patient's report.  - Continued colchicine for gout management.    TYPE 2 DIABETES:  - Patient is type 2 diabetic with good control (last HbA1c in December was 6.8) and is not currently on any medication.  - Counseled the patient on following a diabetic diet at home.  - Ordered hemoglobin A1C test today.    HYPERTENSION:  - Blood pressure is stable at 126/80.  - Advised the patient to continue monitoring blood pressure at home.  - Continue current plan.     HYPERLIPIDEMIA:  - Patient is currently taking atorvastatin 40 mg daily, which will be continued.  - Counseled the patient to follow a low cholesterol, low fat diet at home.  - Ordered fasting lipid panel today.    PERIPHERAL VASCULAR DISEASE:  - Patient has mild edema, approximately +1 to his lower extremities.  - Ordered arterial Doppler with ABIs (ankle-brachial index) for lower extremities.  - Advised to follow up with Dr Kaplan.    EDEMA:  - Patient has mild edema, approximately +1 to his lower extremities.  - Advised the patient to wear compression stockings daily to manage lower extremity swelling.  - Avoid sodium and elevate lower ext.    ALLERGIC RHINITIS:  - Patient reports having environmental allergies but is unsure of the specific cause.  - Prescribed EpiPen for potential anaphylactic reactions to environmental allergens (he has had in the past).  - Instructed the patient to contact the office or ER immediately if experiencing any allergic reactions.     FOLLOW-UP:  - Follow up in 6 months.          Health Maintenance Due   Topic Date Due    Hepatitis C Screening  Never done    Hemoglobin A1c  07/28/2025       Problem List Items Addressed This Visit          Cardiac/Vascular    Hyperlipidemia    Relevant Orders    Lipid Panel    Hypertension    Relevant Orders    CBC Auto Differential    Comprehensive Metabolic Panel       Endocrine    Type 2 diabetes mellitus without complication,  without long-term current use of insulin - Primary    Relevant Orders    Hemoglobin A1C       Orthopedic    Gout    Relevant Medications    colchicine (COLCRYS) 0.6 mg tablet    predniSONE (DELTASONE) 20 MG tablet    Other Relevant Orders    Uric Acid     Other Visit Diagnoses         Anemia, unspecified type        Relevant Orders    Iron and TIBC    Ferritin      Environmental allergies        Relevant Medications    EPINEPHrine (EPIPEN) 0.3 mg/0.3 mL AtIn      PVD (peripheral vascular disease)        Relevant Orders    US Lower Extrem Arteries Bilat with PATITO (xpd)            Health Maintenance Topics with due status: Not Due       Topic Last Completion Date    Colorectal Cancer Screening 01/06/2023    TETANUS VACCINE 10/09/2023    Influenza Vaccine 10/06/2024    Diabetic Eye Exam 12/18/2024    Lipid Panel 01/28/2025    Diabetes Urine Screening 03/28/2025    Foot Exam 03/28/2025    Low Dose Statin 07/01/2025       Future Appointments   Date Time Provider Department Center   7/3/2025  1:00 PM [facility]    1/8/2026  8:00 AM [facility] NURSE, [facility]        This note was generated with the assistance of ambient listening technology. Verbal consent was obtained by the patient and accompanying visitor(s) for the recording of patient appointment to facilitate this note. I attest to having reviewed and edited the generated note for accuracy, though some syntax or spelling errors may persist. Please contact the author of this note for any clarification.     Signature:  ARON Caldera  [facility]  [address] [place] [id number]  [phone]    Date of encounter: 7/1/25

## 2025-07-02 ENCOUNTER — RESULTS FOLLOW-UP (OUTPATIENT)
Dept: FAMILY MEDICINE | Facility: CLINIC | Age: 75
End: 2025-07-02

## 2025-07-03 ENCOUNTER — HOSPITAL ENCOUNTER (OUTPATIENT)
Dept: RADIOLOGY | Facility: HOSPITAL | Age: 75
Discharge: HOME | End: 2025-07-03
Attending: NURSE PRACTITIONER
Payer: MEDICARE

## 2025-07-03 DIAGNOSIS — I73.9 PVD (PERIPHERAL VASCULAR DISEASE): ICD-10-CM

## 2025-07-03 PROCEDURE — 93922 UPR/L XTREMITY ART 2 LEVELS: CPT | Mod: TC

## 2025-07-03 PROCEDURE — 93922 UPR/L XTREMITY ART 2 LEVELS: CPT | Mod: 26,,, | Performed by: RADIOLOGY

## 2025-07-03 PROCEDURE — 93925 LOWER EXTREMITY STUDY: CPT | Mod: 26,,, | Performed by: RADIOLOGY

## 2025-07-16 ENCOUNTER — OFFICE VISIT (OUTPATIENT)
Dept: VASCULAR SURGERY | Facility: CLINIC | Age: 75
End: 2025-07-16
Payer: MEDICARE

## 2025-07-16 VITALS — HEIGHT: 71 IN | BODY MASS INDEX: 30.38 KG/M2 | WEIGHT: 217 LBS

## 2025-07-16 DIAGNOSIS — I73.9 PVD (PERIPHERAL VASCULAR DISEASE): Primary | ICD-10-CM

## 2025-07-16 PROCEDURE — 99213 OFFICE O/P EST LOW 20 MIN: CPT | Mod: S$PBB,,, | Performed by: SURGERY

## 2025-07-16 PROCEDURE — 99213 OFFICE O/P EST LOW 20 MIN: CPT | Mod: PBBFAC | Performed by: SURGERY

## 2025-07-16 PROCEDURE — 99999 PR PBB SHADOW E&M-EST. PATIENT-LVL III: CPT | Mod: PBBFAC,,, | Performed by: SURGERY

## 2025-07-16 NOTE — PROGRESS NOTES
"Subjective:       Patient ID: Noemi Buckner is a 75 y.o. male.    Chief Complaint: Follow-up (Pt states he has no concerns at present or pain. )  This patient had previously seen apparently multiple years ago states his complaints were his feet occasionally feel cold denies strokes mini strokes TIAs denies previous vascular interventions he has had noninvasive arterial studies his ABIs are normal bilaterally.  Next mentioned triphasic biphasic waveforms bilaterally.  Possibly 50% stenosis of the left anterior tibial.  family history includes Heart disease in his father; Hypertension in his mother.  Past Medical History:   Diagnosis Date    Anxiety     Arthritis     Gout a    Hyperlipidemia     Hypertension     Insomnia     Male erectile dysfunction, unspecified       Past Surgical History:   Procedure Laterality Date    APPENDECTOMY  1966    CIRCUMCISION      EYE SURGERY  2012    HERNIA REPAIR  unknown    KNEE ARTHROSCOPY Right     SHOULDER SURGERY Bilateral        reports that he has never smoked. He has been exposed to tobacco smoke. He has never used smokeless tobacco. He reports that he does not drink alcohol and does not use drugs.   HPI  Review of Systems      Objective:      Ht 5' 11" (1.803 m)   Wt 98.4 kg (217 lb)   BMI 30.27 kg/m²    Physical Exam  Constitutional:       Appearance: Normal appearance.   Cardiovascular:      Rate and Rhythm: Normal rate.   Skin:     General: Skin is warm.      Capillary Refill: Capillary refill takes less than 2 seconds.   Neurological:      General: No focal deficit present.      Mental Status: He is alert.   Psychiatric:         Mood and Affect: Mood normal.         Thought Content: Thought content normal.           Assessment:       1. PVD (peripheral vascular disease)        Plan:       Clinically insignificant vascular disease with a normal ABIs educated patient family call for problems      "

## 2025-07-29 ENCOUNTER — OFFICE VISIT (OUTPATIENT)
Dept: FAMILY MEDICINE | Facility: CLINIC | Age: 75
End: 2025-07-29
Payer: MEDICARE

## 2025-07-29 VITALS
HEART RATE: 68 BPM | DIASTOLIC BLOOD PRESSURE: 72 MMHG | BODY MASS INDEX: 29.77 KG/M2 | WEIGHT: 212.63 LBS | TEMPERATURE: 98 F | RESPIRATION RATE: 18 BRPM | OXYGEN SATURATION: 95 % | SYSTOLIC BLOOD PRESSURE: 120 MMHG | HEIGHT: 71 IN

## 2025-07-29 DIAGNOSIS — N52.9 ERECTILE DYSFUNCTION, UNSPECIFIED ERECTILE DYSFUNCTION TYPE: ICD-10-CM

## 2025-07-29 DIAGNOSIS — B35.0 TINEA CAPITIS: Primary | ICD-10-CM

## 2025-07-29 PROCEDURE — 99213 OFFICE O/P EST LOW 20 MIN: CPT | Mod: ,,, | Performed by: STUDENT IN AN ORGANIZED HEALTH CARE EDUCATION/TRAINING PROGRAM

## 2025-07-29 RX ORDER — TADALAFIL 10 MG/1
10 TABLET ORAL DAILY PRN
Qty: 30 TABLET | Refills: 0 | Status: SHIPPED | OUTPATIENT
Start: 2025-07-29 | End: 2026-07-29

## 2025-07-29 RX ORDER — KETOCONAZOLE 20 MG/ML
SHAMPOO, SUSPENSION TOPICAL
Qty: 120 ML | Refills: 0 | Status: SHIPPED | OUTPATIENT
Start: 2025-07-31

## 2025-07-29 NOTE — PROGRESS NOTES
Progress Note     ERIK BATEMAN MD   [address] [place] [address]     PATIENT NAME: Noemi Buckner  : 1950  DATE: 25  MRN: [id number]      Billing Provider: ERIK BATEMAN MD  Level of Service:   Patient PCP Information       Provider PCP Type    ERIK BATEMAN MD General                Rash (Pt presents to clinic for a rash on his scalp. He states he noticed it about a month ago. He states that it does itch sometimes. He states he thinks it has gotten worse since he first noticed it. ) and Medication Refill (Pt wanted to discuss changing from viagra to cialisis. He state the viagra does not work effectively for him. )      SUBJECTIVE:     Noemi Buckner is a 75 y.o.male who presents to clinic for Rash (Pt presents to clinic for a rash on his scalp. He states he noticed it about a month ago. He states that it does itch sometimes. He states he thinks it has gotten worse since he first noticed it. ) and Medication Refill (Pt wanted to discuss changing from viagra to cialisis. He state the viagra does not work effectively for him. )    Patient presents to clinic today with rash on his scalp.  It is present on the left occipital portion of his scalp.  He notes it has been present for about 1 month.  It does itch.  He has had associated hair loss.  Patient has been using an antibacterial ointment without improvement.  Patient also has a history of erectile dysfunction.  He is followed by Urology and it was discussed that he might benefit from penile pump and he is going to a seminar to see if this is something he might be interested in in the future.  However, in the meantime he would like to transition from Viagra to Cialis as Viagra does not seem to be as effective anymore.  He denies any adverse side effects with Viagra.    All other pertinent review of systems negative. Please see HPI for details.     Past Medical History:  has a past  "medical history of Anxiety, Arthritis, Gout (a), Hyperlipidemia, Hypertension, Insomnia, and Male erectile dysfunction, unspecified.   Past Surgical History:  has a past surgical history that includes Circumcision; Shoulder surgery (Bilateral); Knee arthroscopy (Right); Hernia repair (unknown); Appendectomy (1966); and Eye surgery (2012).  Family History: family history includes Heart disease in his father; Hypertension in his mother.  Social History:  reports that he has never smoked. He has been exposed to tobacco smoke. He has never used smokeless tobacco. He reports that he does not drink alcohol and does not use drugs.  Allergies:   Review of patient's allergies indicates:   Allergen Reactions    Allopurinol Rash    Sulfamethoxazole-trimethoprim Rash       Current Medications[1]   OBJECTIVE:     Vital Signs   /72 (BP Location: Left arm, Patient Position: Sitting)   Pulse 68   Temp 98.3 °F (36.8 °C) (Oral)   Resp 18   Ht 5' 11" (1.803 m)   Wt 96.4 kg (212 lb 9.6 oz)   SpO2 95%   BMI 29.65 kg/m²     Physical Exam  Constitutional:       General: He is not in acute distress.     Appearance: Normal appearance. He is not ill-appearing.   HENT:      Head: Normocephalic and atraumatic.   Eyes:      Extraocular Movements: Extraocular movements intact.      Pupils: Pupils are equal, round, and reactive to light.   Cardiovascular:      Rate and Rhythm: Normal rate and regular rhythm.      Heart sounds: No murmur heard.     No friction rub. No gallop.   Pulmonary:      Effort: Pulmonary effort is normal. No respiratory distress.      Breath sounds: Normal breath sounds. No wheezing, rhonchi or rales.   Abdominal:      Palpations: Abdomen is soft.      Tenderness: There is no abdominal tenderness. There is no guarding or rebound.   Musculoskeletal:         General: Normal range of motion.   Skin:     General: Skin is warm and dry.      Capillary Refill: Capillary refill takes less than 2 seconds.      Comments: " Scaly patch located left lateral occipital region of scalp.  Associated hair loss.   Neurological:      General: No focal deficit present.      Mental Status: He is alert.   Psychiatric:         Mood and Affect: Mood normal.         Behavior: Behavior normal.         ASSESSMENT/PLAN:     1. Tinea capitis  -     ketoconazole (NIZORAL) 2 % shampoo; Apply topically twice a week.  Dispense: 120 mL; Refill: 0  Exam consistent with tinea capitis.  Treating with ketoconazole shampoo.  Counseled patient that if he is not improving with the next month that we would need to re-evaluate and refer him to Dermatology for additional evaluation.  Patient verbalized understanding.    2. Erectile dysfunction, unspecified erectile dysfunction type  -     tadalafiL (CIALIS) 10 MG tablet; Take 1 tablet (10 mg total) by mouth daily as needed for Erectile Dysfunction.  Dispense: 30 tablet; Refill: 0  Patient requesting to transitioned to Cialis.  Discussed potential risks and side effects of the medication.  Patient to discontinue Viagra in take Cialis.  Discussed proper use.        Follow up for Next scheduled appointment.      ERIK BATEMAN MD  07/29/2025    Due to voice recognition software, sound alike and misspelled words may be contained in the documentation.              [1]   Current Outpatient Medications:     amLODIPine (NORVASC) 2.5 MG tablet, Take 1 tablet (2.5 mg total) by mouth once daily., Disp: 90 tablet, Rfl: 1    aspirin (ECOTRIN) 81 MG EC tablet, 81 mg., Disp: , Rfl:     atorvastatin (LIPITOR) 40 MG tablet, , Disp: , Rfl:     cetirizine (ZYRTEC) 10 MG tablet, Take 1 tablet (10 mg total) by mouth once daily., Disp: 30 tablet, Rfl: 0    clonazePAM (KLONOPIN) 0.5 MG tablet, , Disp: , Rfl:     colchicine (COLCRYS) 0.6 mg tablet, Take 1 tablet (0.6 mg total) by mouth 2 (two) times daily., Disp: 180 tablet, Rfl: 1    doxepin (SINEQUAN) 25 MG capsule, Take 2 capsules (50 mg total) by mouth nightly., Disp: 30  capsule, Rfl: 3    ferrous sulfate (IRON) 325 mg (65 mg iron) Tab tablet, Take 1 tablet (325 mg total) by mouth once daily., Disp: 90 tablet, Rfl: 0    fluticasone propionate (FLONASE) 50 mcg/actuation nasal spray, 1 spray (50 mcg total) by Each Nostril route once daily., Disp: 15.8 mL, Rfl: 0    latanoprost 0.005 % ophthalmic solution, Place 1 drop into the right eye every evening., Disp: , Rfl:     metoprolol succinate (TOPROL-XL) 100 MG 24 hr tablet, Take 1 tablet (100 mg total) by mouth once daily., Disp: 90 tablet, Rfl: 1    predniSONE (DELTASONE) 20 MG tablet, Take 1 tablet (20 mg total) by mouth 2 (two) times daily., Disp: 10 tablet, Rfl: 1    triamcinolone acetonide 0.025% (KENALOG) 0.025 % cream, Apply topically 2 (two) times daily., Disp: 80 g, Rfl: 1    diclofenac sodium (VOLTAREN ARTHRITIS PAIN) 1 % Gel, Apply 4 g topically 2 (two) times daily. (Patient not taking: Reported on 7/29/2025), Disp: 100 g, Rfl: 2    EPINEPHrine (EPIPEN) 0.3 mg/0.3 mL AtIn, Inject 0.3 mLs (0.3 mg total) into the muscle once. for 1 dose, Disp: 0.3 mL, Rfl: 1    [START ON 7/31/2025] ketoconazole (NIZORAL) 2 % shampoo, Apply topically twice a week., Disp: 120 mL, Rfl: 0    tadalafiL (CIALIS) 10 MG tablet, Take 1 tablet (10 mg total) by mouth daily as needed for Erectile Dysfunction., Disp: 30 tablet, Rfl: 0    tiZANidine (ZANAFLEX) 4 MG tablet, Take 1 tablet (4 mg total) by mouth daily as needed (Muscle spasm). (Patient not taking: Reported on 7/29/2025), Disp: 90 tablet, Rfl: 0

## 2025-08-05 DIAGNOSIS — M10.00 ACUTE IDIOPATHIC GOUT, UNSPECIFIED SITE: ICD-10-CM

## 2025-08-05 RX ORDER — PREDNISONE 20 MG/1
20 TABLET ORAL 2 TIMES DAILY
Qty: 10 TABLET | Refills: 1 | OUTPATIENT
Start: 2025-08-05

## 2025-08-05 NOTE — TELEPHONE ENCOUNTER
Please let patient know that he needs prednisone refill he will have to come to the clinic for a visit.  We do not provide refills for this medication.

## 2025-08-05 NOTE — TELEPHONE ENCOUNTER
Contacted pt's care giver.   Informed her that we would need to see pt for a visit before refilling the prednisone.   She stated that it was suppose to be called in with a refill to [facility] but [facility] stated it was a one time fill with no refills.   Documentation in chart shows 10 tablets with 1 refill.   Discussed with provider. She stated that pt still needs to come in for a visit in order to have the prescription called in.   Instructed pt's spouse of this as well. Verbalized understanding. Stated that she would call to get him scheduled later. Verbalized understanding.   No further questions or concerns stated.

## 2025-08-19 DIAGNOSIS — I10 HYPERTENSION, UNSPECIFIED TYPE: ICD-10-CM

## 2025-08-19 RX ORDER — AMLODIPINE BESYLATE 2.5 MG/1
2.5 TABLET ORAL DAILY
Qty: 90 TABLET | Refills: 1 | Status: SHIPPED | OUTPATIENT
Start: 2025-08-19